# Patient Record
Sex: MALE | Race: WHITE | NOT HISPANIC OR LATINO | Employment: FULL TIME | URBAN - NONMETROPOLITAN AREA
[De-identification: names, ages, dates, MRNs, and addresses within clinical notes are randomized per-mention and may not be internally consistent; named-entity substitution may affect disease eponyms.]

---

## 2017-01-12 DIAGNOSIS — E11.65 TYPE 2 DIABETES MELLITUS WITH HYPERGLYCEMIA (HCC): ICD-10-CM

## 2017-01-12 DIAGNOSIS — E78.5 HYPERLIPIDEMIA: ICD-10-CM

## 2017-01-12 DIAGNOSIS — M79.645 PAIN OF FINGER OF LEFT HAND: ICD-10-CM

## 2017-01-16 ENCOUNTER — APPOINTMENT (OUTPATIENT)
Dept: LAB | Facility: MEDICAL CENTER | Age: 47
End: 2017-01-16
Payer: COMMERCIAL

## 2017-01-16 ENCOUNTER — TRANSCRIBE ORDERS (OUTPATIENT)
Dept: LAB | Facility: MEDICAL CENTER | Age: 47
End: 2017-01-16

## 2017-01-16 DIAGNOSIS — E78.5 HYPERLIPIDEMIA: ICD-10-CM

## 2017-01-16 DIAGNOSIS — E11.65 TYPE 2 DIABETES MELLITUS WITH HYPERGLYCEMIA (HCC): ICD-10-CM

## 2017-01-16 LAB
ALBUMIN SERPL BCP-MCNC: 4.3 G/DL (ref 3.5–5)
ALP SERPL-CCNC: 72 U/L (ref 46–116)
ALT SERPL W P-5'-P-CCNC: 114 U/L (ref 12–78)
ANION GAP SERPL CALCULATED.3IONS-SCNC: 10 MMOL/L (ref 4–13)
AST SERPL W P-5'-P-CCNC: 42 U/L (ref 5–45)
BILIRUB SERPL-MCNC: 0.49 MG/DL (ref 0.2–1)
BUN SERPL-MCNC: 14 MG/DL (ref 5–25)
CALCIUM SERPL-MCNC: 8.8 MG/DL (ref 8.3–10.1)
CHLORIDE SERPL-SCNC: 103 MMOL/L (ref 100–108)
CHOLEST SERPL-MCNC: 139 MG/DL (ref 50–200)
CO2 SERPL-SCNC: 27 MMOL/L (ref 21–32)
CREAT SERPL-MCNC: 1.19 MG/DL (ref 0.6–1.3)
CREAT UR-MCNC: 94.1 MG/DL
EST. AVERAGE GLUCOSE BLD GHB EST-MCNC: 194 MG/DL
GFR SERPL CREATININE-BSD FRML MDRD: >60 ML/MIN/1.73SQ M
GLUCOSE SERPL-MCNC: 175 MG/DL (ref 65–140)
HBA1C MFR BLD: 8.4 % (ref 4.2–6.3)
HDLC SERPL-MCNC: 34 MG/DL (ref 40–60)
LDLC SERPL CALC-MCNC: 65 MG/DL (ref 0–100)
MICROALBUMIN UR-MCNC: 27.4 MG/L (ref 0–20)
MICROALBUMIN/CREAT 24H UR: 29 MG/G CREATININE (ref 0–30)
POTASSIUM SERPL-SCNC: 3.9 MMOL/L (ref 3.5–5.3)
PROT SERPL-MCNC: 7.6 G/DL (ref 6.4–8.2)
SODIUM SERPL-SCNC: 140 MMOL/L (ref 136–145)
TRIGL SERPL-MCNC: 199 MG/DL

## 2017-01-16 PROCEDURE — 82043 UR ALBUMIN QUANTITATIVE: CPT

## 2017-01-16 PROCEDURE — 83036 HEMOGLOBIN GLYCOSYLATED A1C: CPT

## 2017-01-16 PROCEDURE — 82570 ASSAY OF URINE CREATININE: CPT

## 2017-01-16 PROCEDURE — 80061 LIPID PANEL: CPT

## 2017-01-16 PROCEDURE — 36415 COLL VENOUS BLD VENIPUNCTURE: CPT

## 2017-01-16 PROCEDURE — 80053 COMPREHEN METABOLIC PANEL: CPT

## 2017-01-23 ENCOUNTER — ALLSCRIPTS OFFICE VISIT (OUTPATIENT)
Dept: OTHER | Facility: OTHER | Age: 47
End: 2017-01-23

## 2017-01-23 ENCOUNTER — GENERIC CONVERSION - ENCOUNTER (OUTPATIENT)
Dept: FAMILY MEDICINE CLINIC | Facility: CLINIC | Age: 47
End: 2017-01-23

## 2017-02-13 ENCOUNTER — TRANSCRIBE ORDERS (OUTPATIENT)
Dept: RADIOLOGY | Facility: MEDICAL CENTER | Age: 47
End: 2017-02-13

## 2017-02-13 ENCOUNTER — TRANSCRIBE ORDERS (OUTPATIENT)
Dept: LAB | Facility: MEDICAL CENTER | Age: 47
End: 2017-02-13

## 2017-02-13 ENCOUNTER — HOSPITAL ENCOUNTER (OUTPATIENT)
Dept: RADIOLOGY | Facility: MEDICAL CENTER | Age: 47
Discharge: HOME | End: 2017-02-13
Payer: COMMERCIAL

## 2017-02-13 DIAGNOSIS — M79.645 PAIN OF FINGER OF LEFT HAND: ICD-10-CM

## 2017-02-13 PROCEDURE — 73120 X-RAY EXAM OF HAND: CPT

## 2017-02-22 ENCOUNTER — ALLSCRIPTS OFFICE VISIT (OUTPATIENT)
Dept: OTHER | Facility: OTHER | Age: 47
End: 2017-02-22

## 2017-02-22 DIAGNOSIS — M65.4 RADIAL STYLOID TENOSYNOVITIS: ICD-10-CM

## 2017-03-13 ENCOUNTER — TRANSCRIBE ORDERS (OUTPATIENT)
Dept: ADMINISTRATIVE | Facility: HOSPITAL | Age: 47
End: 2017-03-13

## 2017-03-13 ENCOUNTER — APPOINTMENT (OUTPATIENT)
Dept: LAB | Facility: HOSPITAL | Age: 47
End: 2017-03-13
Payer: COMMERCIAL

## 2017-03-13 ENCOUNTER — APPOINTMENT (OUTPATIENT)
Dept: PREADMISSION TESTING | Facility: HOSPITAL | Age: 47
End: 2017-03-13
Payer: COMMERCIAL

## 2017-03-13 DIAGNOSIS — M65.4 RADIAL STYLOID TENOSYNOVITIS: ICD-10-CM

## 2017-03-13 LAB
ANION GAP SERPL CALCULATED.3IONS-SCNC: 9 MMOL/L (ref 4–13)
BASOPHILS # BLD AUTO: 0.04 THOUSANDS/ΜL (ref 0–0.1)
BASOPHILS NFR BLD AUTO: 1 % (ref 0–1)
BUN SERPL-MCNC: 18 MG/DL (ref 5–25)
CALCIUM SERPL-MCNC: 8.8 MG/DL (ref 8.3–10.1)
CHLORIDE SERPL-SCNC: 104 MMOL/L (ref 100–108)
CO2 SERPL-SCNC: 28 MMOL/L (ref 21–32)
CREAT SERPL-MCNC: 1.15 MG/DL (ref 0.6–1.3)
EOSINOPHIL # BLD AUTO: 0.32 THOUSAND/ΜL (ref 0–0.61)
EOSINOPHIL NFR BLD AUTO: 4 % (ref 0–6)
ERYTHROCYTE [DISTWIDTH] IN BLOOD BY AUTOMATED COUNT: 13.7 % (ref 11.6–15.1)
GFR SERPL CREATININE-BSD FRML MDRD: >60 ML/MIN/1.73SQ M
GLUCOSE SERPL-MCNC: 136 MG/DL (ref 65–140)
HCT VFR BLD AUTO: 43.9 % (ref 36.5–49.3)
HGB BLD-MCNC: 14.8 G/DL (ref 12–17)
INR PPP: 1.03 (ref 0.86–1.16)
LYMPHOCYTES # BLD AUTO: 2.69 THOUSANDS/ΜL (ref 0.6–4.47)
LYMPHOCYTES NFR BLD AUTO: 36 % (ref 14–44)
MCH RBC QN AUTO: 29.5 PG (ref 26.8–34.3)
MCHC RBC AUTO-ENTMCNC: 33.7 G/DL (ref 31.4–37.4)
MCV RBC AUTO: 88 FL (ref 82–98)
MONOCYTES # BLD AUTO: 0.61 THOUSAND/ΜL (ref 0.17–1.22)
MONOCYTES NFR BLD AUTO: 8 % (ref 4–12)
NEUTROPHILS # BLD AUTO: 3.77 THOUSANDS/ΜL (ref 1.85–7.62)
NEUTS SEG NFR BLD AUTO: 51 % (ref 43–75)
PLATELET # BLD AUTO: 229 THOUSANDS/UL (ref 149–390)
PMV BLD AUTO: 9.6 FL (ref 8.9–12.7)
POTASSIUM SERPL-SCNC: 4.2 MMOL/L (ref 3.5–5.3)
PROTHROMBIN TIME: 13.2 SECONDS (ref 12–14.3)
RBC # BLD AUTO: 5.02 MILLION/UL (ref 3.88–5.62)
SODIUM SERPL-SCNC: 141 MMOL/L (ref 136–145)
WBC # BLD AUTO: 7.43 THOUSAND/UL (ref 4.31–10.16)

## 2017-03-13 PROCEDURE — 85610 PROTHROMBIN TIME: CPT

## 2017-03-13 PROCEDURE — 80048 BASIC METABOLIC PNL TOTAL CA: CPT

## 2017-03-13 PROCEDURE — 85025 COMPLETE CBC W/AUTO DIFF WBC: CPT

## 2017-03-13 PROCEDURE — 36415 COLL VENOUS BLD VENIPUNCTURE: CPT

## 2017-03-21 ENCOUNTER — ANESTHESIA EVENT (OUTPATIENT)
Dept: PERIOP | Facility: HOSPITAL | Age: 47
End: 2017-03-21
Payer: COMMERCIAL

## 2017-03-23 ENCOUNTER — HOSPITAL ENCOUNTER (OUTPATIENT)
Facility: HOSPITAL | Age: 47
Setting detail: OUTPATIENT SURGERY
Discharge: HOME | End: 2017-03-23
Attending: ORTHOPAEDIC SURGERY | Admitting: ORTHOPAEDIC SURGERY
Payer: COMMERCIAL

## 2017-03-23 ENCOUNTER — ANESTHESIA (OUTPATIENT)
Dept: PERIOP | Facility: HOSPITAL | Age: 47
End: 2017-03-23
Payer: COMMERCIAL

## 2017-03-23 VITALS
WEIGHT: 255 LBS | HEIGHT: 70 IN | DIASTOLIC BLOOD PRESSURE: 75 MMHG | RESPIRATION RATE: 18 BRPM | TEMPERATURE: 98.2 F | SYSTOLIC BLOOD PRESSURE: 109 MMHG | BODY MASS INDEX: 36.51 KG/M2 | HEART RATE: 78 BPM | OXYGEN SATURATION: 96 %

## 2017-03-23 PROBLEM — M65.4 DE QUERVAIN'S TENOSYNOVITIS, RIGHT: Status: ACTIVE | Noted: 2017-03-23

## 2017-03-23 LAB — GLUCOSE SERPL-MCNC: 132 MG/DL (ref 65–140)

## 2017-03-23 PROCEDURE — 82948 REAGENT STRIP/BLOOD GLUCOSE: CPT

## 2017-03-23 RX ORDER — SODIUM CHLORIDE, SODIUM LACTATE, POTASSIUM CHLORIDE, CALCIUM CHLORIDE 600; 310; 30; 20 MG/100ML; MG/100ML; MG/100ML; MG/100ML
125 INJECTION, SOLUTION INTRAVENOUS CONTINUOUS
Status: DISCONTINUED | OUTPATIENT
Start: 2017-03-23 | End: 2017-03-23 | Stop reason: HOSPADM

## 2017-03-23 RX ORDER — FENTANYL CITRATE 50 UG/ML
INJECTION, SOLUTION INTRAMUSCULAR; INTRAVENOUS AS NEEDED
Status: DISCONTINUED | OUTPATIENT
Start: 2017-03-23 | End: 2017-03-23 | Stop reason: SURG

## 2017-03-23 RX ORDER — PROMETHAZINE HYDROCHLORIDE 25 MG/ML
12.5 INJECTION, SOLUTION INTRAMUSCULAR; INTRAVENOUS ONCE AS NEEDED
Status: DISCONTINUED | OUTPATIENT
Start: 2017-03-23 | End: 2017-03-23 | Stop reason: HOSPADM

## 2017-03-23 RX ORDER — ONDANSETRON 2 MG/ML
4 INJECTION INTRAMUSCULAR; INTRAVENOUS ONCE AS NEEDED
Status: DISCONTINUED | OUTPATIENT
Start: 2017-03-23 | End: 2017-03-23 | Stop reason: HOSPADM

## 2017-03-23 RX ORDER — VALSARTAN AND HYDROCHLOROTHIAZIDE 320; 25 MG/1; MG/1
1 TABLET, FILM COATED ORAL
COMMUNITY
End: 2018-09-26

## 2017-03-23 RX ORDER — GLIPIZIDE AND METFORMIN HCL 2.5; 5 MG/1; MG/1
1 TABLET, FILM COATED ORAL
COMMUNITY
End: 2018-04-07 | Stop reason: SDUPTHER

## 2017-03-23 RX ORDER — ONDANSETRON 2 MG/ML
INJECTION INTRAMUSCULAR; INTRAVENOUS AS NEEDED
Status: DISCONTINUED | OUTPATIENT
Start: 2017-03-23 | End: 2017-03-23 | Stop reason: SURG

## 2017-03-23 RX ORDER — MIDAZOLAM HYDROCHLORIDE 1 MG/ML
INJECTION INTRAMUSCULAR; INTRAVENOUS AS NEEDED
Status: DISCONTINUED | OUTPATIENT
Start: 2017-03-23 | End: 2017-03-23 | Stop reason: SURG

## 2017-03-23 RX ORDER — SIMVASTATIN 20 MG
20 TABLET ORAL DAILY
COMMUNITY
End: 2018-12-22 | Stop reason: SDUPTHER

## 2017-03-23 RX ORDER — OXYCODONE HYDROCHLORIDE AND ACETAMINOPHEN 5; 325 MG/1; MG/1
2 TABLET ORAL EVERY 4 HOURS PRN
Status: DISCONTINUED | OUTPATIENT
Start: 2017-03-23 | End: 2017-03-23 | Stop reason: HOSPADM

## 2017-03-23 RX ORDER — METOCLOPRAMIDE HYDROCHLORIDE 5 MG/ML
10 INJECTION INTRAMUSCULAR; INTRAVENOUS ONCE AS NEEDED
Status: DISCONTINUED | OUTPATIENT
Start: 2017-03-23 | End: 2017-03-23 | Stop reason: HOSPADM

## 2017-03-23 RX ORDER — OXYCODONE HYDROCHLORIDE 5 MG/1
5 TABLET ORAL EVERY 4 HOURS PRN
Qty: 30 TABLET | Refills: 0 | Status: SHIPPED | OUTPATIENT
Start: 2017-03-23 | End: 2017-04-02

## 2017-03-23 RX ORDER — MEPERIDINE HYDROCHLORIDE 25 MG/ML
12.5 INJECTION INTRAMUSCULAR; INTRAVENOUS; SUBCUTANEOUS ONCE AS NEEDED
Status: DISCONTINUED | OUTPATIENT
Start: 2017-03-23 | End: 2017-03-23 | Stop reason: HOSPADM

## 2017-03-23 RX ORDER — PROPOFOL 10 MG/ML
INJECTION, EMULSION INTRAVENOUS AS NEEDED
Status: DISCONTINUED | OUTPATIENT
Start: 2017-03-23 | End: 2017-03-23 | Stop reason: SURG

## 2017-03-23 RX ORDER — GLYCOPYRROLATE 0.2 MG/ML
INJECTION INTRAMUSCULAR; INTRAVENOUS AS NEEDED
Status: DISCONTINUED | OUTPATIENT
Start: 2017-03-23 | End: 2017-03-23 | Stop reason: SURG

## 2017-03-23 RX ORDER — FENTANYL CITRATE/PF 50 MCG/ML
25 SYRINGE (ML) INJECTION
Status: DISCONTINUED | OUTPATIENT
Start: 2017-03-23 | End: 2017-03-23 | Stop reason: HOSPADM

## 2017-03-23 RX ORDER — KETOROLAC TROMETHAMINE 30 MG/ML
INJECTION, SOLUTION INTRAMUSCULAR; INTRAVENOUS AS NEEDED
Status: DISCONTINUED | OUTPATIENT
Start: 2017-03-23 | End: 2017-03-23 | Stop reason: SURG

## 2017-03-23 RX ORDER — MAGNESIUM HYDROXIDE 1200 MG/15ML
LIQUID ORAL AS NEEDED
Status: DISCONTINUED | OUTPATIENT
Start: 2017-03-23 | End: 2017-03-23 | Stop reason: HOSPADM

## 2017-03-23 RX ORDER — LIDOCAINE HYDROCHLORIDE 10 MG/ML
INJECTION, SOLUTION INFILTRATION; PERINEURAL AS NEEDED
Status: DISCONTINUED | OUTPATIENT
Start: 2017-03-23 | End: 2017-03-23 | Stop reason: SURG

## 2017-03-23 RX ORDER — METOPROLOL SUCCINATE 50 MG/1
50 TABLET, EXTENDED RELEASE ORAL
COMMUNITY
End: 2018-12-22 | Stop reason: SDUPTHER

## 2017-03-23 RX ADMIN — KETOROLAC TROMETHAMINE 30 MG: 30 INJECTION, SOLUTION INTRAMUSCULAR at 09:32

## 2017-03-23 RX ADMIN — SODIUM CHLORIDE, SODIUM LACTATE, POTASSIUM CHLORIDE, AND CALCIUM CHLORIDE 125 ML/HR: .6; .31; .03; .02 INJECTION, SOLUTION INTRAVENOUS at 07:24

## 2017-03-23 RX ADMIN — LIDOCAINE HYDROCHLORIDE 50 MG: 10 INJECTION, SOLUTION INFILTRATION; PERINEURAL at 09:05

## 2017-03-23 RX ADMIN — GLYCOPYRROLATE 0.2 MG: 0.2 INJECTION, SOLUTION INTRAMUSCULAR; INTRAVENOUS at 09:05

## 2017-03-23 RX ADMIN — PROPOFOL 250 MG: 10 INJECTION, EMULSION INTRAVENOUS at 09:05

## 2017-03-23 RX ADMIN — CEFAZOLIN SODIUM 2000 MG: 2 SOLUTION INTRAVENOUS at 09:09

## 2017-03-23 RX ADMIN — MIDAZOLAM HYDROCHLORIDE 2 MG: 1 INJECTION, SOLUTION INTRAMUSCULAR; INTRAVENOUS at 09:02

## 2017-03-23 RX ADMIN — ONDANSETRON HYDROCHLORIDE 4 MG: 2 INJECTION, SOLUTION INTRAVENOUS at 09:08

## 2017-03-23 RX ADMIN — FENTANYL CITRATE 50 MCG: 50 INJECTION, SOLUTION INTRAMUSCULAR; INTRAVENOUS at 09:12

## 2017-04-04 ENCOUNTER — ALLSCRIPTS OFFICE VISIT (OUTPATIENT)
Dept: OTHER | Facility: OTHER | Age: 47
End: 2017-04-04

## 2017-04-04 DIAGNOSIS — Z98.890 OTHER SPECIFIED POSTPROCEDURAL STATES: ICD-10-CM

## 2017-07-14 DIAGNOSIS — I10 ESSENTIAL (PRIMARY) HYPERTENSION: ICD-10-CM

## 2017-07-14 DIAGNOSIS — E78.5 HYPERLIPIDEMIA: ICD-10-CM

## 2017-07-14 DIAGNOSIS — E11.65 TYPE 2 DIABETES MELLITUS WITH HYPERGLYCEMIA (HCC): ICD-10-CM

## 2017-08-07 ENCOUNTER — TRANSCRIBE ORDERS (OUTPATIENT)
Dept: LAB | Facility: MEDICAL CENTER | Age: 47
End: 2017-08-07

## 2017-08-07 ENCOUNTER — APPOINTMENT (OUTPATIENT)
Dept: LAB | Facility: MEDICAL CENTER | Age: 47
End: 2017-08-07
Payer: COMMERCIAL

## 2017-08-07 DIAGNOSIS — E78.5 HYPERLIPIDEMIA: ICD-10-CM

## 2017-08-07 DIAGNOSIS — I10 ESSENTIAL (PRIMARY) HYPERTENSION: ICD-10-CM

## 2017-08-07 DIAGNOSIS — E11.65 TYPE 2 DIABETES MELLITUS WITH HYPERGLYCEMIA (HCC): ICD-10-CM

## 2017-08-07 LAB
ALBUMIN SERPL BCP-MCNC: 3.9 G/DL (ref 3.5–5)
ALP SERPL-CCNC: 74 U/L (ref 46–116)
ALT SERPL W P-5'-P-CCNC: 84 U/L (ref 12–78)
ANION GAP SERPL CALCULATED.3IONS-SCNC: 8 MMOL/L (ref 4–13)
AST SERPL W P-5'-P-CCNC: 41 U/L (ref 5–45)
BASOPHILS # BLD AUTO: 0.04 THOUSANDS/ΜL (ref 0–0.1)
BASOPHILS NFR BLD AUTO: 1 % (ref 0–1)
BILIRUB SERPL-MCNC: 0.68 MG/DL (ref 0.2–1)
BUN SERPL-MCNC: 16 MG/DL (ref 5–25)
CALCIUM SERPL-MCNC: 8.9 MG/DL (ref 8.3–10.1)
CHLORIDE SERPL-SCNC: 104 MMOL/L (ref 100–108)
CHOLEST SERPL-MCNC: 130 MG/DL (ref 50–200)
CO2 SERPL-SCNC: 26 MMOL/L (ref 21–32)
CREAT SERPL-MCNC: 1.18 MG/DL (ref 0.6–1.3)
CREAT UR-MCNC: 297 MG/DL
EOSINOPHIL # BLD AUTO: 0.35 THOUSAND/ΜL (ref 0–0.61)
EOSINOPHIL NFR BLD AUTO: 6 % (ref 0–6)
ERYTHROCYTE [DISTWIDTH] IN BLOOD BY AUTOMATED COUNT: 13.7 % (ref 11.6–15.1)
EST. AVERAGE GLUCOSE BLD GHB EST-MCNC: 186 MG/DL
GFR SERPL CREATININE-BSD FRML MDRD: 73 ML/MIN/1.73SQ M
GLUCOSE P FAST SERPL-MCNC: 211 MG/DL (ref 65–99)
HBA1C MFR BLD: 8.1 % (ref 4.2–6.3)
HCT VFR BLD AUTO: 43.3 % (ref 36.5–49.3)
HDLC SERPL-MCNC: 29 MG/DL (ref 40–60)
HGB BLD-MCNC: 15.2 G/DL (ref 12–17)
LDLC SERPL CALC-MCNC: 31 MG/DL (ref 0–100)
LYMPHOCYTES # BLD AUTO: 2.61 THOUSANDS/ΜL (ref 0.6–4.47)
LYMPHOCYTES NFR BLD AUTO: 42 % (ref 14–44)
MCH RBC QN AUTO: 30.3 PG (ref 26.8–34.3)
MCHC RBC AUTO-ENTMCNC: 35.1 G/DL (ref 31.4–37.4)
MCV RBC AUTO: 86 FL (ref 82–98)
MICROALBUMIN UR-MCNC: 41.2 MG/L (ref 0–20)
MICROALBUMIN/CREAT 24H UR: 14 MG/G CREATININE (ref 0–30)
MONOCYTES # BLD AUTO: 0.56 THOUSAND/ΜL (ref 0.17–1.22)
MONOCYTES NFR BLD AUTO: 9 % (ref 4–12)
NEUTROPHILS # BLD AUTO: 2.58 THOUSANDS/ΜL (ref 1.85–7.62)
NEUTS SEG NFR BLD AUTO: 42 % (ref 43–75)
NRBC BLD AUTO-RTO: 0 /100 WBCS
PLATELET # BLD AUTO: 201 THOUSANDS/UL (ref 149–390)
PMV BLD AUTO: 10.2 FL (ref 8.9–12.7)
POTASSIUM SERPL-SCNC: 3.9 MMOL/L (ref 3.5–5.3)
PROT SERPL-MCNC: 7.4 G/DL (ref 6.4–8.2)
RBC # BLD AUTO: 5.02 MILLION/UL (ref 3.88–5.62)
SODIUM SERPL-SCNC: 138 MMOL/L (ref 136–145)
TRIGL SERPL-MCNC: 351 MG/DL
WBC # BLD AUTO: 6.16 THOUSAND/UL (ref 4.31–10.16)

## 2017-08-07 PROCEDURE — 82570 ASSAY OF URINE CREATININE: CPT

## 2017-08-07 PROCEDURE — 80061 LIPID PANEL: CPT

## 2017-08-07 PROCEDURE — 85025 COMPLETE CBC W/AUTO DIFF WBC: CPT

## 2017-08-07 PROCEDURE — 82043 UR ALBUMIN QUANTITATIVE: CPT

## 2017-08-07 PROCEDURE — 80053 COMPREHEN METABOLIC PANEL: CPT

## 2017-08-07 PROCEDURE — 36415 COLL VENOUS BLD VENIPUNCTURE: CPT

## 2017-08-07 PROCEDURE — 83036 HEMOGLOBIN GLYCOSYLATED A1C: CPT

## 2017-08-10 ENCOUNTER — ALLSCRIPTS OFFICE VISIT (OUTPATIENT)
Dept: OTHER | Facility: OTHER | Age: 47
End: 2017-08-10

## 2017-09-25 ENCOUNTER — ALLSCRIPTS OFFICE VISIT (OUTPATIENT)
Dept: OTHER | Facility: OTHER | Age: 47
End: 2017-09-25

## 2017-10-04 ENCOUNTER — GENERIC CONVERSION - ENCOUNTER (OUTPATIENT)
Dept: OTHER | Facility: OTHER | Age: 47
End: 2017-10-04

## 2018-01-12 VITALS
DIASTOLIC BLOOD PRESSURE: 80 MMHG | BODY MASS INDEX: 36.4 KG/M2 | OXYGEN SATURATION: 98 % | SYSTOLIC BLOOD PRESSURE: 124 MMHG | HEIGHT: 71 IN | HEART RATE: 89 BPM | WEIGHT: 260 LBS | RESPIRATION RATE: 15 BRPM

## 2018-01-13 VITALS
SYSTOLIC BLOOD PRESSURE: 124 MMHG | HEIGHT: 71 IN | BODY MASS INDEX: 36.29 KG/M2 | WEIGHT: 259.25 LBS | OXYGEN SATURATION: 98 % | HEART RATE: 74 BPM | DIASTOLIC BLOOD PRESSURE: 78 MMHG | RESPIRATION RATE: 15 BRPM

## 2018-01-13 VITALS
OXYGEN SATURATION: 96 % | BODY MASS INDEX: 36.42 KG/M2 | HEART RATE: 88 BPM | HEIGHT: 71 IN | RESPIRATION RATE: 15 BRPM | DIASTOLIC BLOOD PRESSURE: 82 MMHG | WEIGHT: 260.13 LBS | SYSTOLIC BLOOD PRESSURE: 130 MMHG

## 2018-01-13 VITALS
HEART RATE: 64 BPM | DIASTOLIC BLOOD PRESSURE: 81 MMHG | SYSTOLIC BLOOD PRESSURE: 125 MMHG | HEIGHT: 71 IN | WEIGHT: 259 LBS | BODY MASS INDEX: 36.26 KG/M2

## 2018-01-14 VITALS
HEIGHT: 71 IN | HEART RATE: 77 BPM | BODY MASS INDEX: 35.72 KG/M2 | WEIGHT: 255.13 LBS | SYSTOLIC BLOOD PRESSURE: 104 MMHG | DIASTOLIC BLOOD PRESSURE: 70 MMHG

## 2018-01-19 DIAGNOSIS — E11.65 TYPE 2 DIABETES MELLITUS WITH HYPERGLYCEMIA (HCC): ICD-10-CM

## 2018-03-08 ENCOUNTER — TELEPHONE (OUTPATIENT)
Dept: FAMILY MEDICINE CLINIC | Facility: CLINIC | Age: 48
End: 2018-03-08

## 2018-03-08 DIAGNOSIS — J32.9 SINUSITIS, UNSPECIFIED CHRONICITY, UNSPECIFIED LOCATION: Primary | ICD-10-CM

## 2018-03-08 RX ORDER — AMOXICILLIN 500 MG/1
500 TABLET, FILM COATED ORAL 3 TIMES DAILY
Qty: 30 TABLET | Refills: 0 | Status: SHIPPED | OUTPATIENT
Start: 2018-03-08 | End: 2018-03-18

## 2018-04-07 DIAGNOSIS — E11.9 TYPE 2 DIABETES MELLITUS WITHOUT COMPLICATION, WITHOUT LONG-TERM CURRENT USE OF INSULIN (HCC): Primary | ICD-10-CM

## 2018-04-07 RX ORDER — GLIPIZIDE AND METFORMIN HCL 2.5; 5 MG/1; MG/1
TABLET, FILM COATED ORAL
Qty: 360 TABLET | Refills: 2 | Status: SHIPPED | OUTPATIENT
Start: 2018-04-07 | End: 2019-01-02 | Stop reason: SDUPTHER

## 2018-07-30 DIAGNOSIS — E11.9 TYPE 2 DIABETES MELLITUS WITHOUT COMPLICATION, WITHOUT LONG-TERM CURRENT USE OF INSULIN (HCC): Primary | ICD-10-CM

## 2018-09-10 ENCOUNTER — APPOINTMENT (OUTPATIENT)
Dept: LAB | Facility: MEDICAL CENTER | Age: 48
End: 2018-09-10
Payer: COMMERCIAL

## 2018-09-10 DIAGNOSIS — E11.9 TYPE 2 DIABETES MELLITUS WITHOUT COMPLICATION, WITHOUT LONG-TERM CURRENT USE OF INSULIN (HCC): ICD-10-CM

## 2018-09-10 LAB
ALBUMIN SERPL BCP-MCNC: 4 G/DL (ref 3.5–5)
ALP SERPL-CCNC: 67 U/L (ref 46–116)
ALT SERPL W P-5'-P-CCNC: 57 U/L (ref 12–78)
ANION GAP SERPL CALCULATED.3IONS-SCNC: 12 MMOL/L (ref 4–13)
AST SERPL W P-5'-P-CCNC: 26 U/L (ref 5–45)
BILIRUB SERPL-MCNC: 0.55 MG/DL (ref 0.2–1)
BUN SERPL-MCNC: 15 MG/DL (ref 5–25)
CALCIUM SERPL-MCNC: 8.8 MG/DL (ref 8.3–10.1)
CHLORIDE SERPL-SCNC: 102 MMOL/L (ref 100–108)
CHOLEST SERPL-MCNC: 131 MG/DL (ref 50–200)
CO2 SERPL-SCNC: 23 MMOL/L (ref 21–32)
CREAT SERPL-MCNC: 1.25 MG/DL (ref 0.6–1.3)
CREAT UR-MCNC: 40.7 MG/DL
GFR SERPL CREATININE-BSD FRML MDRD: 67 ML/MIN/1.73SQ M
GLUCOSE P FAST SERPL-MCNC: 136 MG/DL (ref 65–99)
HDLC SERPL-MCNC: 29 MG/DL (ref 40–60)
LDLC SERPL CALC-MCNC: 36 MG/DL (ref 0–100)
MICROALBUMIN UR-MCNC: <5 MG/L (ref 0–20)
MICROALBUMIN/CREAT 24H UR: <12 MG/G CREATININE (ref 0–30)
NONHDLC SERPL-MCNC: 102 MG/DL
POTASSIUM SERPL-SCNC: 3.7 MMOL/L (ref 3.5–5.3)
PROT SERPL-MCNC: 7.5 G/DL (ref 6.4–8.2)
SODIUM SERPL-SCNC: 137 MMOL/L (ref 136–145)
TRIGL SERPL-MCNC: 329 MG/DL
TSH SERPL DL<=0.05 MIU/L-ACNC: 1.96 UIU/ML (ref 0.36–3.74)

## 2018-09-10 PROCEDURE — 36415 COLL VENOUS BLD VENIPUNCTURE: CPT

## 2018-09-10 PROCEDURE — 80053 COMPREHEN METABOLIC PANEL: CPT

## 2018-09-10 PROCEDURE — 80061 LIPID PANEL: CPT

## 2018-09-10 PROCEDURE — 84443 ASSAY THYROID STIM HORMONE: CPT

## 2018-09-10 PROCEDURE — 82043 UR ALBUMIN QUANTITATIVE: CPT | Performed by: FAMILY MEDICINE

## 2018-09-10 PROCEDURE — 82570 ASSAY OF URINE CREATININE: CPT | Performed by: FAMILY MEDICINE

## 2018-09-10 PROCEDURE — 83036 HEMOGLOBIN GLYCOSYLATED A1C: CPT

## 2018-09-11 LAB
EST. AVERAGE GLUCOSE BLD GHB EST-MCNC: 163 MG/DL
HBA1C MFR BLD: 7.3 % (ref 4.2–6.3)

## 2018-09-20 DIAGNOSIS — E11.9 TYPE 2 DIABETES MELLITUS WITHOUT COMPLICATION, WITH LONG-TERM CURRENT USE OF INSULIN (HCC): Primary | ICD-10-CM

## 2018-09-20 DIAGNOSIS — Z79.4 TYPE 2 DIABETES MELLITUS WITHOUT COMPLICATION, WITH LONG-TERM CURRENT USE OF INSULIN (HCC): Primary | ICD-10-CM

## 2018-09-20 RX ORDER — SITAGLIPTIN 100 MG/1
TABLET, FILM COATED ORAL
Qty: 90 TABLET | Refills: 3 | Status: SHIPPED | OUTPATIENT
Start: 2018-09-20 | End: 2019-09-16 | Stop reason: SDUPTHER

## 2018-09-26 ENCOUNTER — OFFICE VISIT (OUTPATIENT)
Dept: FAMILY MEDICINE CLINIC | Facility: CLINIC | Age: 48
End: 2018-09-26
Payer: COMMERCIAL

## 2018-09-26 VITALS
DIASTOLIC BLOOD PRESSURE: 84 MMHG | WEIGHT: 254.6 LBS | BODY MASS INDEX: 36.45 KG/M2 | HEIGHT: 70 IN | SYSTOLIC BLOOD PRESSURE: 128 MMHG | RESPIRATION RATE: 16 BRPM | OXYGEN SATURATION: 98 % | HEART RATE: 81 BPM

## 2018-09-26 DIAGNOSIS — G47.33 OBSTRUCTIVE SLEEP APNEA: ICD-10-CM

## 2018-09-26 DIAGNOSIS — Z12.11 ENCOUNTER FOR SCREENING COLONOSCOPY: Primary | ICD-10-CM

## 2018-09-26 DIAGNOSIS — Z23 NEED FOR INFLUENZA VACCINATION: ICD-10-CM

## 2018-09-26 DIAGNOSIS — E11.65 TYPE 2 DIABETES MELLITUS WITH HYPERGLYCEMIA, WITHOUT LONG-TERM CURRENT USE OF INSULIN (HCC): ICD-10-CM

## 2018-09-26 DIAGNOSIS — E78.5 HYPERLIPIDEMIA, UNSPECIFIED HYPERLIPIDEMIA TYPE: ICD-10-CM

## 2018-09-26 DIAGNOSIS — I10 ESSENTIAL HYPERTENSION: ICD-10-CM

## 2018-09-26 PROCEDURE — 3079F DIAST BP 80-89 MM HG: CPT | Performed by: FAMILY MEDICINE

## 2018-09-26 PROCEDURE — 1036F TOBACCO NON-USER: CPT | Performed by: FAMILY MEDICINE

## 2018-09-26 PROCEDURE — 3008F BODY MASS INDEX DOCD: CPT | Performed by: FAMILY MEDICINE

## 2018-09-26 PROCEDURE — 99214 OFFICE O/P EST MOD 30 MIN: CPT | Performed by: FAMILY MEDICINE

## 2018-09-26 PROCEDURE — 3074F SYST BP LT 130 MM HG: CPT | Performed by: FAMILY MEDICINE

## 2018-09-26 RX ORDER — OLMESARTAN MEDOXOMIL AND HYDROCHLOROTHIAZIDE 40/25 40; 25 MG/1; MG/1
1 TABLET ORAL DAILY
Qty: 90 TABLET | Refills: 3 | Status: SHIPPED | OUTPATIENT
Start: 2018-09-26 | End: 2019-08-19 | Stop reason: SDUPTHER

## 2018-09-26 NOTE — PATIENT INSTRUCTIONS
Diabetes in the Older Adult   WHAT YOU NEED TO KNOW:   Older adults with diabetes are at risk for heart disease, stroke, kidney disease, blindness, and nerve damage  You may also be at risk for any of the following:  · Poor nutrition or low blood sugar levels    · Confusion or problems with memory, attention, or learning new things    · Trouble controlling urination or frequent urinary tract infections    · Trouble with coordination or balance    · Falls and injuries    · Pain    · Depression    · Open sores on your legs or feet  DISCHARGE INSTRUCTIONS:   Call 911 for any of the following:   · You have any of the following signs of a stroke:      ¨ Numbness or drooping on one side of your face     ¨ Weakness in an arm or leg    ¨ Confusion or difficulty speaking    ¨ Dizziness, a severe headache, or vision loss    · You have any of the following signs of a heart attack:      ¨ Squeezing, pressure, or pain in your chest that lasts longer than 5 minutes or returns    ¨ Discomfort or pain in your back, neck, jaw, stomach, or arm     ¨ Trouble breathing    ¨ Nausea or vomiting    ¨ Lightheadedness or a sudden cold sweat, especially with chest pain or trouble breathing  Return to the emergency department if:   · You have severe abdominal pain, or the pain spreads to your back  You may also be vomiting  · You have trouble staying awake or focusing  · You are shaking or sweating  · You have blurred or double vision  · Your breath has a fruity, sweet smell  · Your breathing is deep and labored, or rapid and shallow  · Your heartbeat is fast and weak  · You fall and get hurt  Contact your healthcare provider if:   · You are vomiting or have diarrhea  · You have an upset stomach and cannot eat the foods on your meal plan  · You feel weak or more tired than usual      · You feel dizzy, have headaches, or are easily irritated  · Your skin is red, warm, dry, or swollen       · You have a wound that does not heal      · You have numbness in your arms or legs  · You have trouble coping with your illness, or you feel anxious or depressed  · You have problems with your memory  · You have changes in your vision  · You have questions or concerns about your condition or care  Medicines  may be given to decrease the amount of sugar in your blood  You may also need medicine to lower your blood pressure or cholesterol, or medicine to prevent blood clots  Manage the ABCs and prevent problems caused by diabetes:   · Check your blood sugar levels as directed  Your healthcare provider will tell you when and how often to check during the day  Your healthcare provider will also tell you what your blood sugar levels should be before and after a meal  You may need to check for ketones in your urine or blood if your level is higher than directed  Write down your results and show them to your healthcare provider  Your provider may use the results to make changes to your medicine, food, or exercise schedules  Ask your healthcare provider for more information about how to treat a high or low blood sugar level  · Follow your meal plan as directed  A dietitian will help you make a meal plan to keep your blood sugar level steady and make sure you get enough nutrition  Do not skip meals  Your blood sugar level may drop too low if you have taken diabetes medicine and do not eat  Ask your healthcare provider about programs in your community that can deliver the meals to your home  · Try to be active for 30 to 60 minutes most days of the week  Exercise can help keep your blood sugar level steady, decrease your risk of heart disease, and help you lose weight  It can also help improve your balance and decrease your risk for falls  Work with your healthcare provider to create an exercise plan  Ask a family member or friend to exercise with you   Start slow and exercise for 5 to 10 minutes at a time  Examples of activities include walking or swimming  Include muscle strengthening activities 2 to 3 days each week  Include balance training 2 to 3 times each week  Activities that help increase balance include yoga and devan chi      · Maintain a healthy weight  Ask your healthcare provider how much you should weigh  A healthy weight can help you control your diabetes and prevent heart disease  Ask your provider to help you create a weight loss plan if you are overweight  Together you can set manageable weight loss goals  · Do not smoke  Ask your healthcare provider for information if you currently smoke and need help to quit  Do not use e-cigarettes or smokeless tobacco in place of cigarettes or to help you quit  They still contain nicotine  · Manage stress  Stress may increase your blood sugar level  Deep breathing, muscle relaxation, and music may help you relax  Ask your healthcare provider for more information about these practices  Other ways to manage your diabetes:   · Check your feet every day for sores  Look at your whole foot, including the bottom, and between and under your toes  Check for wounds, corns, and calluses  Use a mirror to see the bottom of your feet  The skin on your feet may be shiny, tight, dry, or darker than normal  Your feet may also be cold and pale  Feel your feet by running your hands along the tops, bottoms, sides, and between your toes  Redness, swelling, and warmth are signs of blood flow problems that can lead to a foot ulcer  Do not try to remove corns or calluses yourself  · Wear medical alert identification  Wear medical alert jewelry or carry a card that says you have diabetes  Ask your healthcare provider where to get these items  · Ask about vaccines  You have a higher risk for serious illness if you get the flu, pneumonia, or hepatitis   Ask your healthcare provider if you should get a flu, pneumonia, shingles, or hepatitis B vaccine, and when to get the vaccine  · Keep all appointments  You may need to return to have your A1c checked every 3 months  You will need to return at least once each year to have your feet checked  You will need an eye exam once a year to check for retinopathy  You will also need urine tests every year to check for kidney problems  You may need tests to monitor for heart disease  Write down your questions so you remember to ask them during your visits  · Get help from family and friends  You may need help checking your blood sugar level, giving insulin injections, or preparing your meals  Ask your family and friends to help you with these tasks  Talk to your healthcare provider if you do not have someone at home to help you  A healthcare provider can come to your home to help you with these tasks  Follow up with your healthcare provider as directed: You may need to return to have your A1c checked every 3 months  You will need to return at least once each year to have your feet checked  You will need an eye exam once a year to check for retinopathy  You will also need urine tests every year to check for kidney problems  You may need tests to monitor for heart disease  Write down your questions so you remember to ask them during your visits  © 2017 [employer] Information is for End User's use only and may not be sold, redistributed or otherwise used for commercial purposes  All illustrations and images included in CareNotes® are the copyrighted property of [employer]  or [employer]  The above information is an  only  It is not intended as medical advice for individual conditions or treatments  Talk to your doctor, nurse or pharmacist before following any medical regimen to see if it is safe and effective for you

## 2018-09-26 NOTE — PROGRESS NOTES
Assessment/Plan:    No problem-specific Assessment & Plan notes found for this encounter  Diagnoses and all orders for this visit:    Encounter for screening colonoscopy  -     Ambulatory referral to Gastroenterology; Future    Need for influenza vaccination  -     influenza vaccine, [id number], TRIVALENT, 0 5 mL, preservative-free (SYRINGE), for adult and pediatric patients 3 yr+ (AFLURIA)    Essential hypertension  -     olmesartan-hydrochlorothiazide (BENICAR HCT) 40-25 MG per tablet; Take 1 tablet by mouth daily    Hyperlipidemia, unspecified hyperlipidemia type    Obstructive sleep apnea    Type 2 diabetes mellitus with hyperglycemia, without long-term current use of insulin (HCC)          His A1c is improved  I am happy with his glycemic control  I feel that there is low risk for hypoglycemic events  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  His obstructive sleep apnea is well controlled  He is very compliant with the CPAP machine  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  Subjective:      Patient ID: Mamie Trinh is a 46 y o  male  Diabetes   He has type 2 diabetes mellitus  No MedicAlert identification noted  The initial diagnosis of diabetes was made 7 years ago  Pertinent negatives for hypoglycemia include no confusion, dizziness, headaches, hunger, mood changes, nervousness/anxiousness, pallor, seizures, sleepiness, speech difficulty, sweats or tremors  Pertinent negatives for diabetes include no blurred vision, no chest pain, no fatigue, no foot paresthesias, no foot ulcerations, no polydipsia, no polyphagia, no polyuria, no visual change, no weakness and no weight loss  Pertinent negatives for hypoglycemia complications include no blackouts, no hospitalization, no nocturnal hypoglycemia, no required assistance and no required glucagon injection   Symptoms are stable  Pertinent negatives for diabetic complications include no CVA, heart disease, impotence, nephropathy, peripheral neuropathy, PVD or retinopathy  Risk factors for coronary artery disease include hypertension  Current diabetic treatment includes oral agent (triple therapy)  He is compliant with treatment some of the time  His weight is fluctuating minimally  He is following a generally healthy and high fat/cholesterol diet  When asked about meal planning, he reported none  He has not had a previous visit with a dietitian  He participates in exercise three times a week  He monitors blood glucose at home 1-2 x per week  He monitors urine at home <1 x per month  Blood glucose monitoring compliance is inadequate  His home blood glucose trend is fluctuating minimally  His breakfast blood glucose is taken between 6-7 am  His breakfast blood glucose range is generally 130-140 mg/dl  His lunch blood glucose is taken between 12-1 pm  His lunch blood glucose range is generally 110-130 mg/dl  His dinner blood glucose is taken between 5-6 pm  His dinner blood glucose range is generally 110-130 mg/dl  His highest blood glucose is 130-140 mg/dl  His overall blood glucose range is 130-140 mg/dl  He does not see a podiatrist Eye exam is not current  His A1c was 7 3 percent in September of 2018  He has had no hypoglycemic events  His diabetes is under good control and he is at low risk for having hypoglycemic events  He is aware of the signs and symptoms of hypoglycemia  He knows how to treat the symptoms  He carries sources of sugar with him in his truck  His blood pressure has been under good control  We have decided to switch his valsartan to Benicar/hydrochlorothiazide  He has no chest pain or shortness of breath  He has no headache or vision changes  He has sleep apnea  He is very compliant with his CPAP machine  His setting is 6 cm of water  He feels well on his current regimen    He has no daytime sleepiness or morning headache  He has no a arrhythmia  The following portions of the patient's history were reviewed and updated as appropriate:   He  has a past medical history of Asthma (01/17/2013); De Quervain's tenosynovitis, right (3/23/2017); Diabetes mellitus ([facility] ); Hyperlipidemia; Hypertension; and Sleep apnea  He   Patient Active Problem List    Diagnosis Date Noted    De Quervain's tenosynovitis, right 03/23/2017    Obstructive sleep apnea 08/28/2015    Type 2 diabetes mellitus with hyperglycemia ([facility] ) 05/30/2013    Hyperlipidemia 01/17/2013    Hypertension 01/17/2013     He  has a past surgical history that includes Rotator cuff repair (Right); Clavicle surgery; Inguinal hernia repair (Left); pr incis tendon sheath,radial styloid (Right, 3/23/2017); and Orchiectomy (Left)  His family history includes Asthma in his mother; Atrial fibrillation in his father; Diabetes in his mother  He  reports that he has never smoked  He has never used smokeless tobacco  He reports that he drinks alcohol  He reports that he does not use drugs  Current Outpatient Prescriptions   Medication Sig Dispense Refill    glipiZIDE-metFORMIN (METAGLIP) 2 5-500 MG per tablet TAKE 2 TABLETS TWICE A  tablet 2    JANUVIA 100 MG tablet TAKE 1 TABLET DAILY 90 tablet 3    metoprolol succinate (TOPROL-XL) 50 mg 24 hr tablet Take 50 mg by mouth      simvastatin (ZOCOR) 20 mg tablet Take 20 mg by mouth daily      olmesartan-hydrochlorothiazide (BENICAR HCT) 40-25 MG per tablet Take 1 tablet by mouth daily 90 tablet 3    sitaGLIPtin (JANUVIA) 50 mg tablet Take 50 mg by mouth       No current facility-administered medications for this visit        Current Outpatient Prescriptions on File Prior to Visit   Medication Sig    glipiZIDE-metFORMIN (METAGLIP) 2 5-500 MG per tablet TAKE 2 TABLETS TWICE A DAY    JANUVIA 100 MG tablet TAKE 1 TABLET DAILY    metoprolol succinate (TOPROL-XL) 50 mg 24 hr tablet Take 50 mg by mouth    simvastatin (ZOCOR) 20 mg tablet Take 20 mg by mouth daily    [DISCONTINUED] valsartan-hydrochlorothiazide (DIOVAN-HCT) 320-25 MG per tablet Take 1 tablet by mouth    sitaGLIPtin (JANUVIA) 50 mg tablet Take 50 mg by mouth    [DISCONTINUED] aspirin 81 MG tablet Take 81 mg by mouth daily     No current facility-administered medications on file prior to visit  He has No Known Allergies       Review of Systems   Constitutional: Negative for fatigue and weight loss  Eyes: Negative for blurred vision  Cardiovascular: Negative for chest pain  Endocrine: Negative for polydipsia, polyphagia and polyuria  Genitourinary: Negative for impotence  Skin: Negative for pallor  Neurological: Negative for dizziness, tremors, seizures, speech difficulty, weakness and headaches  Psychiatric/Behavioral: Negative for confusion  The patient is not nervous/anxious  All other systems reviewed and are negative  Objective:      /84 (BP Location: Right arm, Patient Position: Sitting, Cuff Size: Large)   Pulse 81   Resp 16   Ht 5' 10" (1 778 m)   Wt 115 kg (254 lb 9 6 oz)   SpO2 98%   BMI 36 53 kg/m²          Physical Exam   Constitutional: He is oriented to person, place, and time  He appears well-developed and well-nourished  Neck: Normal range of motion  Neck supple  Pulmonary/Chest: Effort normal and breath sounds normal    Abdominal: Soft  Bowel sounds are normal    Musculoskeletal: Normal range of motion  Neurological: He is alert and oriented to person, place, and time  He has normal reflexes  Skin: Skin is warm and dry  Psychiatric: He has a normal mood and affect  His behavior is normal  Judgment and thought content normal    Nursing note and vitals reviewed

## 2018-11-16 DIAGNOSIS — E11.9 TYPE 2 DIABETES MELLITUS WITHOUT COMPLICATION, WITHOUT LONG-TERM CURRENT USE OF INSULIN (HCC): Primary | ICD-10-CM

## 2018-11-16 RX ORDER — CANAGLIFLOZIN 100 MG/1
TABLET, FILM COATED ORAL
Qty: 90 TABLET | Refills: 3 | Status: SHIPPED | OUTPATIENT
Start: 2018-11-16 | End: 2019-02-05

## 2018-12-22 DIAGNOSIS — I10 ESSENTIAL HYPERTENSION: Primary | ICD-10-CM

## 2018-12-24 RX ORDER — METOPROLOL SUCCINATE 50 MG/1
TABLET, EXTENDED RELEASE ORAL
Qty: 2700 TABLET | Refills: 2 | Status: SHIPPED | OUTPATIENT
Start: 2018-12-24 | End: 2020-03-02

## 2018-12-24 RX ORDER — SIMVASTATIN 20 MG
TABLET ORAL
Qty: 90 TABLET | Refills: 3 | Status: SHIPPED | OUTPATIENT
Start: 2018-12-24 | End: 2020-02-03 | Stop reason: SDUPTHER

## 2019-01-02 DIAGNOSIS — E11.9 TYPE 2 DIABETES MELLITUS WITHOUT COMPLICATION, WITHOUT LONG-TERM CURRENT USE OF INSULIN (HCC): ICD-10-CM

## 2019-01-02 RX ORDER — GLIPIZIDE AND METFORMIN HCL 2.5; 5 MG/1; MG/1
TABLET, FILM COATED ORAL
Qty: 360 TABLET | Refills: 2 | Status: SHIPPED | OUTPATIENT
Start: 2019-01-02 | End: 2019-09-30 | Stop reason: SDUPTHER

## 2019-02-05 ENCOUNTER — OFFICE VISIT (OUTPATIENT)
Dept: FAMILY MEDICINE CLINIC | Facility: CLINIC | Age: 49
End: 2019-02-05
Payer: COMMERCIAL

## 2019-02-05 VITALS
BODY MASS INDEX: 35.79 KG/M2 | RESPIRATION RATE: 14 BRPM | HEART RATE: 73 BPM | WEIGHT: 250 LBS | OXYGEN SATURATION: 98 % | SYSTOLIC BLOOD PRESSURE: 132 MMHG | HEIGHT: 70 IN | DIASTOLIC BLOOD PRESSURE: 82 MMHG

## 2019-02-05 DIAGNOSIS — J32.9 SINUSITIS, UNSPECIFIED CHRONICITY, UNSPECIFIED LOCATION: Primary | ICD-10-CM

## 2019-02-05 DIAGNOSIS — E78.5 HYPERLIPIDEMIA, UNSPECIFIED HYPERLIPIDEMIA TYPE: ICD-10-CM

## 2019-02-05 DIAGNOSIS — Z79.4 TYPE 2 DIABETES MELLITUS WITH COMPLICATION, WITH LONG-TERM CURRENT USE OF INSULIN (HCC): Primary | ICD-10-CM

## 2019-02-05 DIAGNOSIS — E11.8 TYPE 2 DIABETES MELLITUS WITH COMPLICATION, WITH LONG-TERM CURRENT USE OF INSULIN (HCC): Primary | ICD-10-CM

## 2019-02-05 DIAGNOSIS — R21 RASH: ICD-10-CM

## 2019-02-05 DIAGNOSIS — E11.65 TYPE 2 DIABETES MELLITUS WITH HYPERGLYCEMIA, WITHOUT LONG-TERM CURRENT USE OF INSULIN (HCC): ICD-10-CM

## 2019-02-05 DIAGNOSIS — G47.33 OBSTRUCTIVE SLEEP APNEA: ICD-10-CM

## 2019-02-05 DIAGNOSIS — I10 ESSENTIAL HYPERTENSION: ICD-10-CM

## 2019-02-05 PROBLEM — E11.9 ENCOUNTER FOR DIABETIC FOOT EXAM (HCC): Status: ACTIVE | Noted: 2019-02-05

## 2019-02-05 LAB
SL AMB POCT HEMOGLOBIN AIC: 6.8 (ref ?–6.5)
SL AMB POCT HEMOGLOBIN AIC: 8.6 (ref ?–6.5)

## 2019-02-05 PROCEDURE — 99214 OFFICE O/P EST MOD 30 MIN: CPT | Performed by: FAMILY MEDICINE

## 2019-02-05 PROCEDURE — 3075F SYST BP GE 130 - 139MM HG: CPT | Performed by: FAMILY MEDICINE

## 2019-02-05 PROCEDURE — 83036 HEMOGLOBIN GLYCOSYLATED A1C: CPT

## 2019-02-05 PROCEDURE — 3008F BODY MASS INDEX DOCD: CPT | Performed by: FAMILY MEDICINE

## 2019-02-05 PROCEDURE — 3079F DIAST BP 80-89 MM HG: CPT | Performed by: FAMILY MEDICINE

## 2019-02-05 RX ORDER — CLOTRIMAZOLE AND BETAMETHASONE DIPROPIONATE 10; .64 MG/G; MG/G
CREAM TOPICAL 2 TIMES DAILY
Qty: 30 G | Refills: 0 | Status: SHIPPED | OUTPATIENT
Start: 2019-02-05 | End: 2020-02-03

## 2019-02-05 RX ORDER — AZITHROMYCIN 250 MG/1
500 TABLET, FILM COATED ORAL EVERY 24 HOURS
Qty: 10 TABLET | Refills: 0 | Status: SHIPPED | OUTPATIENT
Start: 2019-02-05 | End: 2019-02-10

## 2019-02-05 NOTE — PATIENT INSTRUCTIONS

## 2019-02-05 NOTE — PROGRESS NOTES
Assessment/Plan:    Obstructive sleep apnea  Continue current  Type 2 diabetes mellitus with hyperglycemia (HCC)  Lab Results   Component Value Date    HGBA1C 7 3 (H) 09/10/2018     Continue current  Hypertension  Continue current  Hyperlipidemia  Continue high intensity statin  Diagnoses and all orders for this visit:    Sinusitis, unspecified chronicity, unspecified location    Type 2 diabetes mellitus with hyperglycemia, without long-term current use of insulin ([facility] )    Obstructive sleep apnea    Essential hypertension    Hyperlipidemia, unspecified hyperlipidemia type          Subjective:      Patient ID: Reena Baron is a 46 y o  male  His BP is well controlled on his current regimen  He has no CP or SOB  He has no HA or vision changes  His DM is under better control  He has no side effects and no hypoglycemia  His lipids are not at goal   His triglycerides are elevated and his HDL is decreased  He has no myalgia or muscle weakness  The following portions of the patient's history were reviewed and updated as appropriate:   He  has a past medical history of Asthma (01/17/2013); De Quervain's tenosynovitis, right (3/23/2017); Diabetes mellitus ([facility] ); Hyperlipidemia; Hypertension; and Sleep apnea  He   Patient Active Problem List    Diagnosis Date Noted    De Quervain's tenosynovitis, right 03/23/2017    Obstructive sleep apnea 08/28/2015    Type 2 diabetes mellitus with hyperglycemia ([facility] ) 05/30/2013    Hyperlipidemia 01/17/2013    Hypertension 01/17/2013     He  has a past surgical history that includes Rotator cuff repair (Right); Clavicle surgery; Inguinal hernia repair (Left); pr incis tendon sheath,radial styloid (Right, 3/23/2017); and Orchiectomy (Left)  His family history includes Asthma in his mother; Atrial fibrillation in his father; Diabetes in his mother  He  reports that he has never smoked   He has never used smokeless tobacco  He reports that he drinks alcohol  He reports that he does not use drugs  Current Outpatient Prescriptions   Medication Sig Dispense Refill    glipiZIDE-metFORMIN (METAGLIP) 2 5-500 MG per tablet TAKE 2 TABLETS TWICE A  tablet 2    INVOKANA 100 MG TAKE 1 TABLET DAILY 90 tablet 3    JANUVIA 100 MG tablet TAKE 1 TABLET DAILY 90 tablet 3    metoprolol succinate (TOPROL-XL) 50 mg 24 hr tablet TAKE 1 TABLET DAILY 2700 tablet 2    olmesartan-hydrochlorothiazide (BENICAR HCT) 40-25 MG per tablet Take 1 tablet by mouth daily 90 tablet 3    simvastatin (ZOCOR) 20 mg tablet TAKE 1 TABLET DAILY IN THE EVENING 90 tablet 3    sitaGLIPtin (JANUVIA) 50 mg tablet Take 50 mg by mouth       No current facility-administered medications for this visit  Current Outpatient Prescriptions on File Prior to Visit   Medication Sig    glipiZIDE-metFORMIN (METAGLIP) 2 5-500 MG per tablet TAKE 2 TABLETS TWICE A DAY    INVOKANA 100 MG TAKE 1 TABLET DAILY    JANUVIA 100 MG tablet TAKE 1 TABLET DAILY    metoprolol succinate (TOPROL-XL) 50 mg 24 hr tablet TAKE 1 TABLET DAILY    olmesartan-hydrochlorothiazide (BENICAR HCT) 40-25 MG per tablet Take 1 tablet by mouth daily    simvastatin (ZOCOR) 20 mg tablet TAKE 1 TABLET DAILY IN THE EVENING    sitaGLIPtin (JANUVIA) 50 mg tablet Take 50 mg by mouth    [DISCONTINUED] aspirin 81 MG tablet Take 81 mg by mouth daily     No current facility-administered medications on file prior to visit  He has No Known Allergies       Review of Systems   All other systems reviewed and are negative  Objective:      /82 (BP Location: Left arm, Patient Position: Sitting)   Pulse 73   Resp 14   Ht 5' 10" (1 778 m)   Wt 113 kg (250 lb)   SpO2 98%   BMI 35 87 kg/m²          Physical Exam   Constitutional: He is oriented to person, place, and time  He appears well-developed and well-nourished  Neck: Normal range of motion  Neck supple     Cardiovascular: Normal rate, regular rhythm, normal heart sounds and intact distal pulses  Pulmonary/Chest: Effort normal and breath sounds normal    Abdominal: Soft  Bowel sounds are normal    Musculoskeletal: Normal range of motion  Neurological: He is alert and oriented to person, place, and time  Skin: Skin is warm and dry  Psychiatric: He has a normal mood and affect  His behavior is normal  Judgment and thought content normal    Nursing note and vitals reviewed

## 2019-08-19 DIAGNOSIS — I10 ESSENTIAL HYPERTENSION: ICD-10-CM

## 2019-08-19 RX ORDER — OLMESARTAN MEDOXOMIL AND HYDROCHLOROTHIAZIDE 40/25 40; 25 MG/1; MG/1
TABLET ORAL
Qty: 90 TABLET | Refills: 4 | Status: SHIPPED | OUTPATIENT
Start: 2019-08-19 | End: 2020-11-11

## 2019-09-16 DIAGNOSIS — Z79.4 TYPE 2 DIABETES MELLITUS WITHOUT COMPLICATION, WITH LONG-TERM CURRENT USE OF INSULIN (HCC): ICD-10-CM

## 2019-09-16 DIAGNOSIS — E11.9 TYPE 2 DIABETES MELLITUS WITHOUT COMPLICATION, WITH LONG-TERM CURRENT USE OF INSULIN (HCC): ICD-10-CM

## 2019-09-16 RX ORDER — SITAGLIPTIN 100 MG/1
TABLET, FILM COATED ORAL
Qty: 90 TABLET | Refills: 4 | Status: SHIPPED | OUTPATIENT
Start: 2019-09-16 | End: 2020-02-03

## 2019-09-30 DIAGNOSIS — E11.9 TYPE 2 DIABETES MELLITUS WITHOUT COMPLICATION, WITHOUT LONG-TERM CURRENT USE OF INSULIN (HCC): ICD-10-CM

## 2019-09-30 RX ORDER — GLIPIZIDE AND METFORMIN HCL 2.5; 5 MG/1; MG/1
TABLET, FILM COATED ORAL
Qty: 360 TABLET | Refills: 4 | Status: SHIPPED | OUTPATIENT
Start: 2019-09-30 | End: 2020-12-23

## 2019-10-02 ENCOUNTER — TELEPHONE (OUTPATIENT)
Dept: FAMILY MEDICINE CLINIC | Facility: CLINIC | Age: 49
End: 2019-10-02

## 2019-10-02 DIAGNOSIS — E11.9 TYPE 2 DIABETES MELLITUS WITHOUT COMPLICATION, WITHOUT LONG-TERM CURRENT USE OF INSULIN (HCC): Primary | ICD-10-CM

## 2019-10-29 LAB — HBA1C MFR BLD HPLC: 8.5 %

## 2019-10-31 ENCOUNTER — OFFICE VISIT (OUTPATIENT)
Dept: FAMILY MEDICINE CLINIC | Facility: CLINIC | Age: 49
End: 2019-10-31
Payer: COMMERCIAL

## 2019-10-31 VITALS
HEIGHT: 70 IN | DIASTOLIC BLOOD PRESSURE: 82 MMHG | HEART RATE: 73 BPM | SYSTOLIC BLOOD PRESSURE: 134 MMHG | BODY MASS INDEX: 36.22 KG/M2 | OXYGEN SATURATION: 97 % | RESPIRATION RATE: 14 BRPM | WEIGHT: 253 LBS

## 2019-10-31 DIAGNOSIS — G47.33 OBSTRUCTIVE SLEEP APNEA: ICD-10-CM

## 2019-10-31 DIAGNOSIS — E11.65 TYPE 2 DIABETES MELLITUS WITH HYPERGLYCEMIA, WITHOUT LONG-TERM CURRENT USE OF INSULIN (HCC): ICD-10-CM

## 2019-10-31 DIAGNOSIS — E11.9 TYPE 2 DIABETES MELLITUS WITHOUT COMPLICATION, WITH LONG-TERM CURRENT USE OF INSULIN (HCC): Primary | ICD-10-CM

## 2019-10-31 DIAGNOSIS — I10 ESSENTIAL HYPERTENSION: ICD-10-CM

## 2019-10-31 DIAGNOSIS — E66.01 MORBID OBESITY (HCC): ICD-10-CM

## 2019-10-31 DIAGNOSIS — Z79.4 TYPE 2 DIABETES MELLITUS WITHOUT COMPLICATION, WITH LONG-TERM CURRENT USE OF INSULIN (HCC): Primary | ICD-10-CM

## 2019-10-31 DIAGNOSIS — E78.5 HYPERLIPIDEMIA, UNSPECIFIED HYPERLIPIDEMIA TYPE: ICD-10-CM

## 2019-10-31 PROCEDURE — 3008F BODY MASS INDEX DOCD: CPT | Performed by: FAMILY MEDICINE

## 2019-10-31 PROCEDURE — 3075F SYST BP GE 130 - 139MM HG: CPT | Performed by: FAMILY MEDICINE

## 2019-10-31 PROCEDURE — 99214 OFFICE O/P EST MOD 30 MIN: CPT | Performed by: FAMILY MEDICINE

## 2019-10-31 PROCEDURE — 3079F DIAST BP 80-89 MM HG: CPT | Performed by: FAMILY MEDICINE

## 2019-10-31 RX ORDER — VALSARTAN AND HYDROCHLOROTHIAZIDE 320; 25 MG/1; MG/1
1 TABLET, FILM COATED ORAL
COMMUNITY
End: 2020-02-03

## 2019-10-31 NOTE — PROGRESS NOTES
Assessment/Plan:    No problem-specific Assessment & Plan notes found for this encounter  Diagnoses and all orders for this visit:    Type 2 diabetes mellitus without complication, with long-term current use of insulin (HCC)  -     Phentermine-Topiramate (QSYMIA) 3 75-23 MG CP24; Take 1 tablet by mouth daily    Type 2 diabetes mellitus with hyperglycemia, without long-term current use of insulin (HCC)  -     Phentermine-Topiramate (QSYMIA) 3 75-23 MG CP24; Take 1 tablet by mouth daily    Morbid obesity (HCC)  -     Phentermine-Topiramate (QSYMIA) 3 75-23 MG CP24; Take 1 tablet by mouth daily    Essential hypertension  -     Echo complete with contrast if indicated; Future    Hyperlipidemia, unspecified hyperlipidemia type    Obstructive sleep apnea  -     Echo complete with contrast if indicated; Future    Other orders  -     valsartan-hydrochlorothiazide (DIOVAN-HCT) 320-25 MG per tablet; Take 1 tablet by mouth          Subjective:      Patient ID: Mere Denson is a 44 y o  male  His A1c is not at goal although it is better than previous values  He is on maximum doses of for medications  We are trying to hold off on insulin due to his driving requirements  His blood pressure is at goal on his current regimen which includes an angiotensin receptor blocker  He has no headache or vision changes  He has no chest pain or shortness of breath  His lipids are at goal on his current regimen  Admittedly, this is not high-intensity statin therapy  I am reluctant to change his regimen given his lipid panel and tolerability of his medication  The following portions of the patient's history were reviewed and updated as appropriate:   He  has a past medical history of Asthma (01/17/2013), De Quervain's tenosynovitis, right (3/23/2017), [facility] ([facility] ), Hyperlipidemia, Hypertension, and Sleep apnea    He   Patient Active Problem List    Diagnosis Date Noted    Encounter for diabetic foot exam ([facility] ) 02/05/2019    De Quervain's tenosynovitis, right 03/23/2017    Obstructive sleep apnea 08/28/2015    Type 2 diabetes mellitus with hyperglycemia ([facility] ) 05/30/2013    Hyperlipidemia 01/17/2013    Hypertension 01/17/2013     He  has a past surgical history that includes Rotator cuff repair (Right); Clavicle surgery; Inguinal hernia repair (Left); pr incis tendon sheath,radial styloid (Right, 3/23/2017); and Orchiectomy (Left)  His family history includes Asthma in his mother; Atrial fibrillation in his father; Diabetes in his mother  He  reports that he has never smoked  He has never used smokeless tobacco  He reports that he drinks alcohol  He reports that he does not use drugs  Current Outpatient Medications   Medication Sig Dispense Refill    Canagliflozin (INVOKANA) 300 MG TABS Take 1 tablet (300 mg total) by mouth daily 90 tablet 3    clotrimazole-betamethasone (LOTRISONE) 1-0 05 % cream Apply topically 2 (two) times a day 30 g 0    glipiZIDE-metFORMIN (METAGLIP) 2 5-500 MG per tablet TAKE 2 TABLETS TWICE A  tablet 4    JANUVIA 100 MG tablet TAKE 1 TABLET DAILY 90 tablet 4    metoprolol succinate (TOPROL-XL) 50 mg 24 hr tablet TAKE 1 TABLET DAILY 2700 tablet 2    olmesartan-hydrochlorothiazide (BENICAR HCT) 40-25 MG per tablet TAKE 1 TABLET DAILY 90 tablet 4    Phentermine-Topiramate (QSYMIA) 3 75-23 MG CP24 Take 1 tablet by mouth daily 30 capsule 5    simvastatin (ZOCOR) 20 mg tablet TAKE 1 TABLET DAILY IN THE EVENING 90 tablet 3    valsartan-hydrochlorothiazide (DIOVAN-HCT) 320-25 MG per tablet Take 1 tablet by mouth       No current facility-administered medications for this visit        Current Outpatient Medications on File Prior to Visit   Medication Sig    Canagliflozin (INVOKANA) 300 MG TABS Take 1 tablet (300 mg total) by mouth daily    clotrimazole-betamethasone (LOTRISONE) 1-0 05 % cream Apply topically 2 (two) times a day    glipiZIDE-metFORMIN (METAGLIP) 2 5-500 MG per tablet TAKE 2 TABLETS TWICE A DAY    JANUVIA 100 MG tablet TAKE 1 TABLET DAILY    metoprolol succinate (TOPROL-XL) 50 mg 24 hr tablet TAKE 1 TABLET DAILY    olmesartan-hydrochlorothiazide (BENICAR HCT) 40-25 MG per tablet TAKE 1 TABLET DAILY    simvastatin (ZOCOR) 20 mg tablet TAKE 1 TABLET DAILY IN THE EVENING    valsartan-hydrochlorothiazide (DIOVAN-HCT) 320-25 MG per tablet Take 1 tablet by mouth    [DISCONTINUED] aspirin 81 MG tablet Take 81 mg by mouth daily    [DISCONTINUED] sitaGLIPtin (JANUVIA) 50 mg tablet Take 50 mg by mouth     No current facility-administered medications on file prior to visit  He has No Known Allergies       Review of Systems   All other systems reviewed and are negative  Objective:      /82   Pulse 73   Resp 14   Ht 5' 10" (1 778 m)   Wt 115 kg (253 lb)   SpO2 97%   BMI 36 30 kg/m²          Physical Exam   Constitutional: He is oriented to person, place, and time  He appears well-developed and well-nourished  Neck: Normal range of motion  Neck supple  Cardiovascular: Normal rate, regular rhythm, normal heart sounds and intact distal pulses  Pulmonary/Chest: Effort normal and breath sounds normal    Abdominal: Soft  Bowel sounds are normal    Musculoskeletal: Normal range of motion  Neurological: He is alert and oriented to person, place, and time  Skin: Skin is warm and dry  Psychiatric: He has a normal mood and affect  His behavior is normal  Judgment and thought content normal    Nursing note and vitals reviewed

## 2020-01-24 DIAGNOSIS — E11.65 TYPE 2 DIABETES MELLITUS WITH HYPERGLYCEMIA, WITHOUT LONG-TERM CURRENT USE OF INSULIN (HCC): ICD-10-CM

## 2020-01-24 RX ORDER — CANAGLIFLOZIN 300 MG/1
TABLET, FILM COATED ORAL
Qty: 90 TABLET | Refills: 0 | Status: SHIPPED | OUTPATIENT
Start: 2020-01-24 | End: 2020-04-27

## 2020-02-03 ENCOUNTER — OFFICE VISIT (OUTPATIENT)
Dept: FAMILY MEDICINE CLINIC | Facility: CLINIC | Age: 50
End: 2020-02-03
Payer: COMMERCIAL

## 2020-02-03 VITALS
HEIGHT: 70 IN | SYSTOLIC BLOOD PRESSURE: 132 MMHG | DIASTOLIC BLOOD PRESSURE: 80 MMHG | WEIGHT: 264 LBS | BODY MASS INDEX: 37.8 KG/M2

## 2020-02-03 DIAGNOSIS — I10 ESSENTIAL HYPERTENSION: ICD-10-CM

## 2020-02-03 DIAGNOSIS — Q23.1 BICUSPID AORTIC VALVE: ICD-10-CM

## 2020-02-03 DIAGNOSIS — E78.5 HYPERLIPIDEMIA, UNSPECIFIED HYPERLIPIDEMIA TYPE: ICD-10-CM

## 2020-02-03 DIAGNOSIS — E11.65 TYPE 2 DIABETES MELLITUS WITH HYPERGLYCEMIA, WITHOUT LONG-TERM CURRENT USE OF INSULIN (HCC): Primary | ICD-10-CM

## 2020-02-03 LAB — SL AMB POCT HEMOGLOBIN AIC: 7 (ref ?–6.5)

## 2020-02-03 PROCEDURE — 1036F TOBACCO NON-USER: CPT | Performed by: FAMILY MEDICINE

## 2020-02-03 PROCEDURE — 3075F SYST BP GE 130 - 139MM HG: CPT | Performed by: FAMILY MEDICINE

## 2020-02-03 PROCEDURE — 3079F DIAST BP 80-89 MM HG: CPT | Performed by: FAMILY MEDICINE

## 2020-02-03 PROCEDURE — 83036 HEMOGLOBIN GLYCOSYLATED A1C: CPT | Performed by: FAMILY MEDICINE

## 2020-02-03 PROCEDURE — 99214 OFFICE O/P EST MOD 30 MIN: CPT | Performed by: FAMILY MEDICINE

## 2020-02-03 PROCEDURE — 3051F HG A1C>EQUAL 7.0%<8.0%: CPT | Performed by: FAMILY MEDICINE

## 2020-02-03 PROCEDURE — 3008F BODY MASS INDEX DOCD: CPT | Performed by: FAMILY MEDICINE

## 2020-02-03 RX ORDER — IBUPROFEN 200 MG
TABLET ORAL
COMMUNITY
Start: 2017-10-03

## 2020-02-03 RX ORDER — SIMVASTATIN 20 MG
20 TABLET ORAL EVERY EVENING
Qty: 90 TABLET | Refills: 3 | Status: SHIPPED | OUTPATIENT
Start: 2020-02-03 | End: 2020-05-04

## 2020-02-03 NOTE — PROGRESS NOTES
Assessment/Plan:    Type 2 diabetes mellitus with hyperglycemia [facility])    Lab Results   Component Value Date    HGBA1C 8 5 10/29/2019   Great improvement  Continue current  Hypertension  Stable  Continue current  Hyperlipidemia  Stable  Continue current  Diagnoses and all orders for this visit:    Type 2 diabetes mellitus with hyperglycemia, without long-term current use of insulin ([facility])    Essential hypertension  -     simvastatin (ZOCOR) 20 mg tablet; Take 1 tablet (20 mg total) by mouth every evening    Hyperlipidemia, unspecified hyperlipidemia type    Bicuspid aortic valve  -     Echo complete with contrast if indicated; Future    Other orders  -     ibuprofen (MOTRIN) 200 mg tablet          Subjective:      Patient ID: Jerryl Osgood is a 44 y o  male  His blood pressure is at goal on his current regimen  He has no chest pain or shortness of breath  He has no headache or vision changes  He has no PND, orthopnea, or dyspnea on exertion  There is suspicion of bicuspid aortic valve  He is due for TTE  His lipid panel is at goal on 20 mg of Zocor  Admittedly, this is not "high-intensity statin therapy "  Regardless, his LDL is 50 mg/dL, his liver function testing is within normal limits, and he has no side effects  His A1c is down to 7 0%  In the office today  He has no side effects or hypoglycemia  He is using his CPAP daily and benefits from its use  He has less fatigue  He denies HA or vision changes  He has more restful sleep  The following portions of the patient's history were reviewed and updated as appropriate:   He  has a past medical history of Asthma (01/17/2013), De Quervain's tenosynovitis, right (3/23/2017), [facility] mellitus ([facility] ), Hyperlipidemia, Hypertension, and Sleep apnea    He   Patient Active Problem List    Diagnosis Date Noted    [facility]    De Quervain's tenosynovitis, right 03/23/2017    Obstructive sleep apnea 08/28/2015    Type 2 diabetes mellitus with hyperglycemia ([facility] ) 05/30/2013    Hyperlipidemia 01/17/2013    Hypertension 01/17/2013     He  has a past surgical history that includes Rotator cuff repair (Right); Clavicle surgery; Inguinal hernia repair (Left); pr incis tendon sheath,radial styloid (Right, 3/23/2017); and Orchiectomy (Left)  His family history includes Asthma in his mother; Atrial fibrillation in his father; Diabetes in his mother  He  reports that he has never smoked  He has never used smokeless tobacco  He reports that he drinks alcohol  He reports that he does not use drugs  Current Outpatient Medications   Medication Sig Dispense Refill    glipiZIDE-metFORMIN (METAGLIP) 2 5-500 MG per tablet TAKE 2 TABLETS TWICE A  tablet 4    ibuprofen (MOTRIN) 200 mg tablet       INVOKANA 300 MG TABS TAKE 1 TABLET DAILY 90 tablet 0    metoprolol succinate (TOPROL-XL) 50 mg 24 hr tablet TAKE 1 TABLET DAILY 2700 tablet 2    olmesartan-hydrochlorothiazide (BENICAR HCT) 40-25 MG per tablet TAKE 1 TABLET DAILY 90 tablet 4    simvastatin (ZOCOR) 20 mg tablet Take 1 tablet (20 mg total) by mouth every evening 90 tablet 3     No current facility-administered medications for this visit        Current Outpatient Medications on File Prior to Visit   Medication Sig    glipiZIDE-metFORMIN (METAGLIP) 2 5-500 MG per tablet TAKE 2 TABLETS TWICE A DAY    ibuprofen (MOTRIN) 200 mg tablet     INVOKANA 300 MG TABS TAKE 1 TABLET DAILY    metoprolol succinate (TOPROL-XL) 50 mg 24 hr tablet TAKE 1 TABLET DAILY    olmesartan-hydrochlorothiazide (BENICAR HCT) 40-25 MG per tablet TAKE 1 TABLET DAILY    [DISCONTINUED] JANUVIA 100 MG tablet TAKE 1 TABLET DAILY    [DISCONTINUED] simvastatin (ZOCOR) 20 mg tablet TAKE 1 TABLET DAILY IN THE EVENING    [DISCONTINUED] valsartan-hydrochlorothiazide (DIOVAN-HCT) 320-25 MG per tablet Take 1 tablet by mouth    [DISCONTINUED] aspirin 81 MG tablet Take 81 mg by mouth daily    [DISCONTINUED] clotrimazole-betamethasone (LOTRISONE) 1-0 05 % cream Apply topically 2 (two) times a day (Patient not taking: Reported on 2/3/2020)    [DISCONTINUED] Phentermine-Topiramate (QSYMIA) 3 75-23 MG CP24 Take 1 tablet by mouth daily (Patient not taking: Reported on 2/3/2020)     No current facility-administered medications on file prior to visit  He has No Known Allergies       Review of Systems   All other systems reviewed and are negative  Objective:      /80   Ht 5' 10" (1 778 m)   Wt 120 kg (264 lb)   BMI 37 88 kg/m²          Physical Exam   Constitutional: He is oriented to person, place, and time  He appears well-developed and well-nourished  Neck: Normal range of motion  Neck supple  Cardiovascular: Normal rate, regular rhythm, normal heart sounds and intact distal pulses  Pulmonary/Chest: Effort normal and breath sounds normal    Abdominal: Soft  Bowel sounds are normal    Musculoskeletal: Normal range of motion  Neurological: He is alert and oriented to person, place, and time  Skin: Skin is warm and dry  Capillary refill takes less than 2 seconds  Psychiatric: He has a normal mood and affect  His behavior is normal  Judgment and thought content normal    Nursing note and vitals reviewed

## 2020-02-03 NOTE — PROGRESS NOTES
BMI Counseling: Body mass index is 37 88 kg/m²  The BMI is above normal  Nutrition recommendations include reducing portion sizes, decreasing overall calorie intake, 3-5 servings of fruits/vegetables daily, reducing fast food intake, consuming healthier snacks, decreasing soda and/or juice intake, moderation in carbohydrate intake, increasing intake of lean protein, reducing intake of saturated fat and trans fat and reducing intake of cholesterol  Exercise recommendations include moderate aerobic physical activity for 150 minutes/week, vigorous aerobic physical activity for 75 minutes/week, exercising 3-5 times per week, joining a gym and strength training exercises

## 2020-02-26 ENCOUNTER — TELEPHONE (OUTPATIENT)
Dept: FAMILY MEDICINE CLINIC | Facility: CLINIC | Age: 50
End: 2020-02-26

## 2020-03-02 DIAGNOSIS — I10 ESSENTIAL HYPERTENSION: ICD-10-CM

## 2020-03-02 RX ORDER — METOPROLOL SUCCINATE 50 MG/1
TABLET, EXTENDED RELEASE ORAL
Qty: 90 TABLET | Refills: 4 | Status: SHIPPED | OUTPATIENT
Start: 2020-03-02 | End: 2021-05-26

## 2020-04-24 DIAGNOSIS — E11.65 TYPE 2 DIABETES MELLITUS WITH HYPERGLYCEMIA, WITHOUT LONG-TERM CURRENT USE OF INSULIN (HCC): ICD-10-CM

## 2020-04-27 RX ORDER — CANAGLIFLOZIN 300 MG/1
TABLET, FILM COATED ORAL
Qty: 90 TABLET | Refills: 3 | Status: SHIPPED | OUTPATIENT
Start: 2020-04-27 | End: 2021-04-18

## 2020-05-04 DIAGNOSIS — I10 ESSENTIAL HYPERTENSION: ICD-10-CM

## 2020-05-04 RX ORDER — SIMVASTATIN 20 MG
TABLET ORAL
Qty: 90 TABLET | Refills: 3 | Status: SHIPPED | OUTPATIENT
Start: 2020-05-04 | End: 2021-04-24

## 2020-11-11 DIAGNOSIS — I10 ESSENTIAL HYPERTENSION: ICD-10-CM

## 2020-11-11 RX ORDER — OLMESARTAN MEDOXOMIL AND HYDROCHLOROTHIAZIDE 40/25 40; 25 MG/1; MG/1
TABLET ORAL
Qty: 90 TABLET | Refills: 3 | Status: SHIPPED | OUTPATIENT
Start: 2020-11-11 | End: 2021-11-08

## 2020-12-09 DIAGNOSIS — Z79.4 TYPE 2 DIABETES MELLITUS WITHOUT COMPLICATION, WITH LONG-TERM CURRENT USE OF INSULIN (HCC): ICD-10-CM

## 2020-12-09 DIAGNOSIS — E11.9 TYPE 2 DIABETES MELLITUS WITHOUT COMPLICATION, WITH LONG-TERM CURRENT USE OF INSULIN (HCC): ICD-10-CM

## 2020-12-09 RX ORDER — SITAGLIPTIN 100 MG/1
TABLET, FILM COATED ORAL
Qty: 90 TABLET | Refills: 3 | Status: SHIPPED | OUTPATIENT
Start: 2020-12-09 | End: 2022-01-07

## 2020-12-23 DIAGNOSIS — E11.9 TYPE 2 DIABETES MELLITUS WITHOUT COMPLICATION, WITHOUT LONG-TERM CURRENT USE OF INSULIN (HCC): ICD-10-CM

## 2020-12-23 RX ORDER — GLIPIZIDE AND METFORMIN HCL 2.5; 5 MG/1; MG/1
TABLET, FILM COATED ORAL
Qty: 360 TABLET | Refills: 3 | Status: SHIPPED | OUTPATIENT
Start: 2020-12-23 | End: 2021-11-30

## 2021-01-08 ENCOUNTER — OFFICE VISIT (OUTPATIENT)
Dept: FAMILY MEDICINE CLINIC | Facility: CLINIC | Age: 51
End: 2021-01-08
Payer: COMMERCIAL

## 2021-01-08 VITALS
HEIGHT: 70 IN | WEIGHT: 240 LBS | TEMPERATURE: 97.9 F | OXYGEN SATURATION: 97 % | BODY MASS INDEX: 34.36 KG/M2 | SYSTOLIC BLOOD PRESSURE: 130 MMHG | DIASTOLIC BLOOD PRESSURE: 72 MMHG | HEART RATE: 70 BPM

## 2021-01-08 DIAGNOSIS — E11.9 TYPE 2 DIABETES MELLITUS WITHOUT COMPLICATION, WITHOUT LONG-TERM CURRENT USE OF INSULIN (HCC): Primary | ICD-10-CM

## 2021-01-08 DIAGNOSIS — I10 ESSENTIAL HYPERTENSION: ICD-10-CM

## 2021-01-08 DIAGNOSIS — E11.65 TYPE 2 DIABETES MELLITUS WITH HYPERGLYCEMIA, WITHOUT LONG-TERM CURRENT USE OF INSULIN (HCC): ICD-10-CM

## 2021-01-08 DIAGNOSIS — G47.33 OBSTRUCTIVE SLEEP APNEA: ICD-10-CM

## 2021-01-08 DIAGNOSIS — E78.5 HYPERLIPIDEMIA, UNSPECIFIED HYPERLIPIDEMIA TYPE: ICD-10-CM

## 2021-01-08 LAB — SL AMB POCT HEMOGLOBIN AIC: 7.8 (ref ?–6.5)

## 2021-01-08 PROCEDURE — 1036F TOBACCO NON-USER: CPT | Performed by: FAMILY MEDICINE

## 2021-01-08 PROCEDURE — 3075F SYST BP GE 130 - 139MM HG: CPT | Performed by: FAMILY MEDICINE

## 2021-01-08 PROCEDURE — 3078F DIAST BP <80 MM HG: CPT | Performed by: FAMILY MEDICINE

## 2021-01-08 PROCEDURE — 3008F BODY MASS INDEX DOCD: CPT | Performed by: FAMILY MEDICINE

## 2021-01-08 PROCEDURE — 83036 HEMOGLOBIN GLYCOSYLATED A1C: CPT | Performed by: FAMILY MEDICINE

## 2021-01-08 PROCEDURE — 3051F HG A1C>EQUAL 7.0%<8.0%: CPT | Performed by: FAMILY MEDICINE

## 2021-01-08 PROCEDURE — 3725F SCREEN DEPRESSION PERFORMED: CPT | Performed by: FAMILY MEDICINE

## 2021-01-08 PROCEDURE — 99214 OFFICE O/P EST MOD 30 MIN: CPT | Performed by: FAMILY MEDICINE

## 2021-01-08 NOTE — PROGRESS NOTES
Diabetic Foot Exam    Patient's shoes and socks removed  Right Foot/Ankle   Right Foot Inspection  Skin Exam: skin normal and skin intact no dry skin, no warmth, no callus, no erythema, no maceration, no abnormal color, no pre-ulcer, no ulcer and no callus                          Toe Exam: ROM and strength within normal limits  Sensory   Vibration: intact  Proprioception: intact   Monofilament testing: intact  Vascular  Capillary refills: < 3 seconds  The right DP pulse is 2+  The right PT pulse is 2+  Left Foot/Ankle  Left Foot Inspection  Skin Exam: skin normal and skin intactno dry skin, no warmth, no erythema, no maceration, normal color, no pre-ulcer, no ulcer and no callus                         Toe Exam: ROM and strength within normal limits                   Sensory   Vibration: intact  Proprioception: intact  Monofilament: intact  Vascular  Capillary refills: < 3 seconds  The left DP pulse is 2+  The left PT pulse is 2+  Assign Risk Category:  No deformity present; No loss of protective sensation; No weak pulses       Risk: 0  Assessment/Plan:    De Quervain's tenosynovitis, right  Resolved  Type 2 diabetes mellitus with hyperglycemia ([facility])    Lab Results   Component Value Date    HGBA1C 7 8 (A) 01/08/2021     Stable  Continue current  Obstructive sleep apnea  Stable  Continue current  Hypertension  Stable  Continue current  Hyperlipidemia  Stable  Continue current  Diagnoses and all orders for this visit:    Type 2 diabetes mellitus without complication, without long-term current use of insulin ([facility])  -     POCT hemoglobin A1c  -     Lipid panel; Future  -     Comprehensive metabolic panel; Future  -     TSH, 3rd generation;  Future    Type 2 diabetes mellitus with hyperglycemia, without long-term current use of insulin ([facility] )    Obstructive sleep apnea    Essential hypertension    Hyperlipidemia, unspecified hyperlipidemia type          Subjective:      Patient ID: Kayla Shanksefren Aleida Coats is a 44 y o  male  Blood pressure is under good control in the office today  He is on 3 agents for hypertension:  Metoprolol, Benicar, and hydrochlorothiazide  He has no chest pain or shortness of breath  He has no headache or vision changes  He has no PND, orthopnea, or dyspnea on exertion  He has no episodes of hypotension  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  He has type 2 diabetes mellitus  His hemoglobin A1c is 7 8 percent in the office today  He has had no hypoglycemic events  He is currently taking metformin, Januvia, Invokana, and glipizide  He is not on and GLP-1 or insulin  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  He has hyperlipidemia  He is due for a lipid panel  He is on simvastatin 20 mg  Although this is not high-intensity statin therapy,  his LDL has been at goal and he has no side effects from his current regimen  He has obstructive sleep apnea  He has been compliant with his CPAP machine  I reviewed his CPAP data today  He has no daytime somnolence  There has been no history of him falling asleep at inappropriate time  Based on the compliance data he presents today, 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  The following portions of the patient's history were reviewed and updated as appropriate:   He  has a past medical history of Asthma (01/17/2013), De Quervain's tenosynovitis, right (3/23/2017), Diabetes mellitus ([facility] ), Hyperlipidemia, Hypertension, and Sleep apnea    He   Patient Active Problem List    Diagnosis Date Noted    [facility]counter [facility] foot exam ([facility] ) 02/05/2019    De Quervain's tenosynovitis, right 03/23/2017    Obstructive sleep apnea 08/28/2015    Type 2 diabetes mellitus with hyperglycemia ([facility] ) 05/30/2013    Hyperlipidemia 01/17/2013    Hypertension 01/17/2013     He  has a past surgical history that includes Rotator cuff repair (Right); Clavicle surgery; Inguinal hernia repair (Left); pr incis tendon sheath,radial styloid (Right, 3/23/2017); and Orchiectomy (Left)  His family history includes Asthma in his mother; Atrial fibrillation in his father; Diabetes in his mother  He  reports that he has never smoked  He has never used smokeless tobacco  He reports current alcohol use  He reports that he does not use drugs  Current Outpatient Medications   Medication Sig Dispense Refill    glipiZIDE-metFORMIN (METAGLIP) 2 5-500 MG per tablet TAKE 2 TABLETS TWICE A  tablet 3    glucose blood (FREESTYLE LITE) test strip Test daily  100 each 5    ibuprofen (MOTRIN) 200 mg tablet       INVOKANA 300 MG TABS TAKE 1 TABLET DAILY 90 tablet 3    Januvia 100 MG tablet TAKE 1 TABLET DAILY 90 tablet 3    metoprolol succinate (TOPROL-XL) 50 mg 24 hr tablet TAKE 1 TABLET DAILY 90 tablet 4    olmesartan-hydrochlorothiazide (BENICAR HCT) 40-25 MG per tablet TAKE 1 TABLET DAILY 90 tablet 3    simvastatin (ZOCOR) 20 mg tablet TAKE 1 TABLET DAILY IN THE EVENING 90 tablet 3     No current facility-administered medications for this visit  Current Outpatient Medications on File Prior to Visit   Medication Sig    glipiZIDE-metFORMIN (METAGLIP) 2 5-500 MG per tablet TAKE 2 TABLETS TWICE A DAY    glucose blood (FREESTYLE LITE) test strip Test daily   ibuprofen (MOTRIN) 200 mg tablet     INVOKANA 300 MG TABS TAKE 1 TABLET DAILY    Januvia 100 MG tablet TAKE 1 TABLET DAILY    metoprolol succinate (TOPROL-XL) 50 mg 24 hr tablet TAKE 1 TABLET DAILY    olmesartan-hydrochlorothiazide (BENICAR HCT) 40-25 MG per tablet TAKE 1 TABLET DAILY    simvastatin (ZOCOR) 20 mg tablet TAKE 1 TABLET DAILY IN THE EVENING    [DISCONTINUED] aspirin 81 MG tablet Take 81 mg by mouth daily     No current facility-administered medications on file prior to visit  He has No Known Allergies       Review of Systems   All other systems reviewed and are negative  Objective:      /72   Pulse 70   Temp 97 9 °F (36 6 °C)   Ht 5' 10" (1 778 m)   Wt 109 kg (240 lb)   SpO2 97%   BMI 34 44 kg/m²          Physical Exam  Vitals signs and nursing note reviewed  Constitutional:       Appearance: Normal appearance  He is obese  HENT:      Head: Normocephalic and atraumatic  Neck:      Musculoskeletal: Normal range of motion and neck supple  Cardiovascular:      Rate and Rhythm: Normal rate and regular rhythm  Pulses: Normal pulses  no weak pulses          Dorsalis pedis pulses are 2+ on the right side and 2+ on the left side  Posterior tibial pulses are 2+ on the right side and 2+ on the left side  Heart sounds: Normal heart sounds  Pulmonary:      Effort: Pulmonary effort is normal       Breath sounds: Normal breath sounds  Abdominal:      General: Abdomen is flat  Bowel sounds are normal       Palpations: Abdomen is soft  Musculoskeletal: Normal range of motion  Feet:      Right foot:      Skin integrity: No ulcer, skin breakdown, erythema, warmth, callus or dry skin  Left foot:      Skin integrity: No ulcer, skin breakdown, erythema, warmth, callus or dry skin  Skin:     General: Skin is warm and dry  Capillary Refill: Capillary refill takes less than 2 seconds  Neurological:      General: No focal deficit present  Mental Status: He is alert and oriented to person, place, and time  Mental status is at baseline  Psychiatric:         Mood and Affect: Mood normal          Behavior: Behavior normal          Thought Content:  Thought content normal          Judgment: Judgment normal

## 2021-04-18 DIAGNOSIS — E11.65 TYPE 2 DIABETES MELLITUS WITH HYPERGLYCEMIA, WITHOUT LONG-TERM CURRENT USE OF INSULIN (HCC): ICD-10-CM

## 2021-04-18 RX ORDER — CANAGLIFLOZIN 300 MG/1
TABLET, FILM COATED ORAL
Qty: 90 TABLET | Refills: 3 | Status: SHIPPED | OUTPATIENT
Start: 2021-04-18 | End: 2022-04-13

## 2021-04-24 DIAGNOSIS — I10 ESSENTIAL HYPERTENSION: ICD-10-CM

## 2021-04-24 RX ORDER — SIMVASTATIN 20 MG
TABLET ORAL
Qty: 90 TABLET | Refills: 3 | Status: SHIPPED | OUTPATIENT
Start: 2021-04-24 | End: 2022-04-19

## 2021-05-06 ENCOUNTER — VBI (OUTPATIENT)
Dept: ADMINISTRATIVE | Facility: OTHER | Age: 51
End: 2021-05-06

## 2021-05-26 DIAGNOSIS — I10 ESSENTIAL HYPERTENSION: ICD-10-CM

## 2021-05-26 RX ORDER — METOPROLOL SUCCINATE 50 MG/1
TABLET, EXTENDED RELEASE ORAL
Qty: 90 TABLET | Refills: 3 | Status: SHIPPED | OUTPATIENT
Start: 2021-05-26 | End: 2022-04-28

## 2021-07-02 ENCOUNTER — RA CDI HCC (OUTPATIENT)
Dept: OTHER | Facility: HOSPITAL | Age: 51
End: 2021-07-02

## 2021-07-02 NOTE — PROGRESS NOTES
[facility]  coding opportunities          Chart reviewed, no opportunity found: CHART REVIEWED, NO OPPORTUNITY FOUND                     Patients insurance company:  [employer] [facility] (Medicare Advantage and Commercial)

## 2021-10-27 ENCOUNTER — VBI (OUTPATIENT)
Dept: ADMINISTRATIVE | Facility: OTHER | Age: 51
End: 2021-10-27

## 2021-11-08 DIAGNOSIS — I10 ESSENTIAL HYPERTENSION: ICD-10-CM

## 2021-11-08 RX ORDER — OLMESARTAN MEDOXOMIL AND HYDROCHLOROTHIAZIDE 40/25 40; 25 MG/1; MG/1
TABLET ORAL
Qty: 90 TABLET | Refills: 3 | Status: SHIPPED | OUTPATIENT
Start: 2021-11-08

## 2021-11-30 DIAGNOSIS — E11.9 TYPE 2 DIABETES MELLITUS WITHOUT COMPLICATION, WITHOUT LONG-TERM CURRENT USE OF INSULIN (HCC): ICD-10-CM

## 2021-11-30 RX ORDER — GLIPIZIDE AND METFORMIN HCL 2.5; 5 MG/1; MG/1
TABLET, FILM COATED ORAL
Qty: 360 TABLET | Refills: 3 | Status: SHIPPED | OUTPATIENT
Start: 2021-11-30

## 2022-01-05 ENCOUNTER — APPOINTMENT (OUTPATIENT)
Dept: LAB | Facility: CLINIC | Age: 52
End: 2022-01-05
Payer: COMMERCIAL

## 2022-01-05 DIAGNOSIS — E11.9 TYPE 2 DIABETES MELLITUS WITHOUT COMPLICATION, WITHOUT LONG-TERM CURRENT USE OF INSULIN (HCC): ICD-10-CM

## 2022-01-05 PROCEDURE — 80053 COMPREHEN METABOLIC PANEL: CPT

## 2022-01-05 PROCEDURE — 36415 COLL VENOUS BLD VENIPUNCTURE: CPT

## 2022-01-05 PROCEDURE — 82570 ASSAY OF URINE CREATININE: CPT | Performed by: FAMILY MEDICINE

## 2022-01-05 PROCEDURE — 80061 LIPID PANEL: CPT

## 2022-01-05 PROCEDURE — 82043 UR ALBUMIN QUANTITATIVE: CPT | Performed by: FAMILY MEDICINE

## 2022-01-05 PROCEDURE — 84443 ASSAY THYROID STIM HORMONE: CPT

## 2022-01-06 LAB
ALBUMIN SERPL BCP-MCNC: 4.4 G/DL (ref 3.5–5)
ALP SERPL-CCNC: 68 U/L (ref 46–116)
ALT SERPL W P-5'-P-CCNC: 77 U/L (ref 12–78)
ANION GAP SERPL CALCULATED.3IONS-SCNC: 7 MMOL/L (ref 4–13)
AST SERPL W P-5'-P-CCNC: 29 U/L (ref 5–45)
BILIRUB SERPL-MCNC: 0.81 MG/DL (ref 0.2–1)
BUN SERPL-MCNC: 15 MG/DL (ref 5–25)
CALCIUM SERPL-MCNC: 9.8 MG/DL (ref 8.3–10.1)
CHLORIDE SERPL-SCNC: 100 MMOL/L (ref 100–108)
CHOLEST SERPL-MCNC: 132 MG/DL
CO2 SERPL-SCNC: 28 MMOL/L (ref 21–32)
CREAT SERPL-MCNC: 1.05 MG/DL (ref 0.6–1.3)
CREAT UR-MCNC: 83.6 MG/DL
GFR SERPL CREATININE-BSD FRML MDRD: 80 ML/MIN/1.73SQ M
GLUCOSE P FAST SERPL-MCNC: 117 MG/DL (ref 65–99)
HDLC SERPL-MCNC: 35 MG/DL
LDLC SERPL CALC-MCNC: 47 MG/DL (ref 0–100)
MICROALBUMIN UR-MCNC: 12.1 MG/L (ref 0–20)
MICROALBUMIN/CREAT 24H UR: 14 MG/G CREATININE (ref 0–30)
NONHDLC SERPL-MCNC: 97 MG/DL
POTASSIUM SERPL-SCNC: 3.7 MMOL/L (ref 3.5–5.3)
PROT SERPL-MCNC: 8 G/DL (ref 6.4–8.2)
SODIUM SERPL-SCNC: 135 MMOL/L (ref 136–145)
TRIGL SERPL-MCNC: 251 MG/DL
TSH SERPL DL<=0.05 MIU/L-ACNC: 1.82 UIU/ML (ref 0.36–3.74)

## 2022-01-06 PROCEDURE — 3061F NEG MICROALBUMINURIA REV: CPT | Performed by: FAMILY MEDICINE

## 2022-01-07 ENCOUNTER — OFFICE VISIT (OUTPATIENT)
Dept: FAMILY MEDICINE CLINIC | Facility: CLINIC | Age: 52
End: 2022-01-07
Payer: COMMERCIAL

## 2022-01-07 VITALS
HEIGHT: 70 IN | TEMPERATURE: 97.9 F | DIASTOLIC BLOOD PRESSURE: 78 MMHG | SYSTOLIC BLOOD PRESSURE: 138 MMHG | OXYGEN SATURATION: 97 % | HEART RATE: 71 BPM | BODY MASS INDEX: 34.5 KG/M2 | WEIGHT: 241 LBS

## 2022-01-07 DIAGNOSIS — Z11.4 SCREENING FOR HIV (HUMAN IMMUNODEFICIENCY VIRUS): ICD-10-CM

## 2022-01-07 DIAGNOSIS — E11.65 TYPE 2 DIABETES MELLITUS WITH HYPERGLYCEMIA, WITHOUT LONG-TERM CURRENT USE OF INSULIN (HCC): Primary | ICD-10-CM

## 2022-01-07 DIAGNOSIS — J45.20 MILD INTERMITTENT ASTHMA WITHOUT COMPLICATION: ICD-10-CM

## 2022-01-07 DIAGNOSIS — E11.9 TYPE 2 DIABETES MELLITUS WITHOUT COMPLICATION, WITH LONG-TERM CURRENT USE OF INSULIN (HCC): ICD-10-CM

## 2022-01-07 DIAGNOSIS — Z11.59 NEED FOR HEPATITIS C SCREENING TEST: ICD-10-CM

## 2022-01-07 DIAGNOSIS — Z79.4 TYPE 2 DIABETES MELLITUS WITHOUT COMPLICATION, WITH LONG-TERM CURRENT USE OF INSULIN (HCC): ICD-10-CM

## 2022-01-07 PROBLEM — E78.00 PURE HYPERCHOLESTEROLEMIA: Status: ACTIVE | Noted: 2017-10-03

## 2022-01-07 LAB — SL AMB POCT HEMOGLOBIN AIC: 8.5 (ref ?–6.5)

## 2022-01-07 PROCEDURE — 99214 OFFICE O/P EST MOD 30 MIN: CPT | Performed by: FAMILY MEDICINE

## 2022-01-07 PROCEDURE — 3052F HG A1C>EQUAL 8.0%<EQUAL 9.0%: CPT | Performed by: FAMILY MEDICINE

## 2022-01-07 PROCEDURE — 83036 HEMOGLOBIN GLYCOSYLATED A1C: CPT | Performed by: FAMILY MEDICINE

## 2022-01-07 PROCEDURE — 99396 PREV VISIT EST AGE 40-64: CPT | Performed by: FAMILY MEDICINE

## 2022-01-07 PROCEDURE — 3008F BODY MASS INDEX DOCD: CPT | Performed by: FAMILY MEDICINE

## 2022-01-07 PROCEDURE — 3725F SCREEN DEPRESSION PERFORMED: CPT | Performed by: FAMILY MEDICINE

## 2022-01-07 RX ORDER — PEN NEEDLE, DIABETIC 31 GX5/16"
NEEDLE, DISPOSABLE MISCELLANEOUS WEEKLY
Qty: 100 EACH | Refills: 0 | Status: SHIPPED | OUTPATIENT
Start: 2022-01-07 | End: 2022-07-01

## 2022-01-07 RX ORDER — SEMAGLUTIDE 1.34 MG/ML
0.25 INJECTION, SOLUTION SUBCUTANEOUS WEEKLY
Qty: 1.5 ML | Refills: 5 | Status: SHIPPED | OUTPATIENT
Start: 2022-01-07 | End: 2022-04-01 | Stop reason: SDUPTHER

## 2022-01-07 NOTE — PROGRESS NOTES
Depression Screening and Follow-up Plan: Patient was screened for depression during today's encounter  They screened negative with a PHQ-2 score of 0

## 2022-01-07 NOTE — PATIENT INSTRUCTIONS
Mediterranean Diet   AMBULATORY CARE:   A Mediterranean diet  is a meal plan that includes foods that are commonly eaten in countries that border the [place]  This meal plan may provide several health benefits  These include losing or maintaining weight, and decreasing blood pressure, blood sugar, and cholesterol levels  It may also help protect against certain health conditions such as heart disease, cancer, type 2 diabetes, and Alzheimer disease  Work with a dietitian to develop a meal plan that is right for you  Foods to include in the [address] [facility] diet:   · Include fruits and vegetables in each meal   Eat a variety of fresh fruits and vegetables  · Choose whole grains every day  These foods include whole-grain breads, pastas, and cereals  It also includes brown rice, quinoa, and millet  · Use unsaturated fats instead of saturated fats  Cook with olive or canola oil  Limit saturated fats, such as butter, margarine, and shortening  Saturated fat is an unhealthy fat that can increase your cholesterol levels  · Choose plant foods, poultry, and fish as your main sources of protein  ? Eat plant-based foods that provide protein,  such as lentils, beans, chickpeas, nuts, and seeds  Choose mostly plant-based foods in place of meat on most days of the week  ? Eat protein foods high in omega-3 fats  Fish high in omega-3 fats include salmon, trout, and tuna  Include these types of fish 1 or 2 times each week  Limit fish high in mercury, such as shark, swordfish, tilefish, and adele mackerel  Omega-3 fats are also found in walnuts and flaxseed  ? Choose poultry (chicken or turkey)  without skin instead of red meat  Red meat is high in saturated fat  Limit eggs and high-fat meats, such as jacome, sausage, and hot dogs  · Choose low-fat dairy foods  such as nonfat or 1% milk, or low-fat almond, cashew, or soy milk   Other examples include low-fat cheese, yogurt, and cottage cheese  · Limit sweets  Limit your intake of high-sugar foods, such as soda, desserts, and candy  · Talk to your healthcare provider about alcohol  Studies have shown that moderate intake of wine may reduce the risk of heart disease  A moderate amount of wine is 1 serving for women and men 65 years and older each day  Two servings is recommended for men 24to 59years of age each day  A serving of wine is 5 ounces  Other things you need to know if you follow the Mediterranean diet:   · Include foods high in iron and vitamin C   Plant-based foods that are high in iron include spinach, beans, tofu, and artichoke  Eat a serving of vitamin C with any iron-rich food to help your body absorb more iron  Examples include oranges, strawberries, cantaloupe, broccoli, and yellow peppers  · Get regular physical activity  The Mediterranean diet will have the most benefit if you get regular physical activity  Get 30 minutes of physical activity at least 5 days a week  Choose physical activities that increase your heart rate  Examples include walking, hiking, swimming, and riding a bike  Ask your healthcare provider about the best exercise plan for you  © Copyright [employer] 2021 Information is for End User's use only and may not be sold, redistributed or otherwise used for commercial purposes  All illustrations and images included in CareNotes® are the copyrighted property of A D A M , Inc  or [facility] Knox   The above information is an  only  It is not intended as medical advice for individual conditions or treatments  Talk to your doctor, nurse or pharmacist before following any medical regimen to see if it is safe and effective for you  Walk 30 minutes a day, 5 days a week

## 2022-01-07 NOTE — PROGRESS NOTES
BMI Counseling: Body mass index is 34 58 kg/m²  The BMI is above normal  Nutrition recommendations include reducing portion sizes, decreasing overall calorie intake, 3-5 servings of fruits/vegetables daily, reducing fast food intake, consuming healthier snacks, decreasing soda and/or juice intake, moderation in carbohydrate intake, increasing intake of lean protein, reducing intake of saturated fat and trans fat and reducing intake of cholesterol  Exercise recommendations include moderate aerobic physical activity for 150 minutes/week, vigorous aerobic physical activity for 75 minutes/week, exercising 3-5 times per week, joining a gym and strength training exercises

## 2022-01-07 NOTE — PROGRESS NOTES
Assessment/Plan:    Type 2 diabetes mellitus with hyperglycemia (HCC)    Lab Results   Component Value Date    HGBA1C 8 5 (A) 01/07/2022   Continue current  Obstructive sleep apnea  Stable  Continue current  Hypertension  Stable  Continue current  Hyperlipidemia  Continue current  Diagnoses and all orders for this visit:    Type 2 diabetes mellitus with hyperglycemia, without long-term current use of insulin ([facility] )  -     Ambulatory Referral to Ophthalmology; Future  -     POCT hemoglobin A1c  -     Glucometer test strips    Need for hepatitis C screening test  -     Hepatitis C Antibody ([facility]); Future  -     Glucometer test strips    Screening for HIV (human immunodeficiency virus)  -     HIV 1/2 Antigen/Antibody (4th Generation) w Reflex [facility]; Future  -     Glucometer test strips    Type 2 diabetes mellitus without complication, with long-term current use of insulin ([facility])  -     Semaglutide,0 25 or 0 5MG/DOS, (Ozempic, 0 25 or 0 5 MG/DOSE,) 2 MG/1 5ML SOPN; Inject 0 25 mg under the skin once a week  -     Insulin Pen Needle (PEN NEEDLES 31GX5/16") 31G X 8 MM MISC; Use once a week  -     Glucometer test strips    Mild intermittent asthma without complication  -     Glucometer test strips          Subjective:      Patient ID: Reena Baron is a 46 y o  male  His A1c is not at goal in the office today at 8 5 percent  Fortunately, he has no side effects from his current regimen and no hypoglycemic events  He is not on insulin  There should be no contraindication for 's license  His blood pressure is at goal in the office today  He has no chest pain or shortness of breath  He has no headache or vision changes  He has no PND, orthopnea, or dyspnea on exertion  He has no syncope or near syncope  There should be no contraindication to 's license  His lipid panel is the best that it has been in the past several years    His HDL remains below goal but has come up  His LDL is below goal   His liver function testing is within normal limits and he has no myalgia or muscle weakness  He has obstructive sleep apnea  He has 100 percent compliance with use of his CPAP machine  He provides evidence of use today  The following portions of the patient's history were reviewed and updated as appropriate:   He  has a past medical history of Asthma (01/17/2013), De Quervain's tenosynovitis, right (3/23/2017), Diabetes mellitus ([facility] ), Hyperlipidemia, Hypertension, and Sleep apnea  He   Patient Active Problem List    Diagnosis Date Noted    Encounter for diabetic foot exam ([place] [address] ) 02/05/2019    Pure hypercholesterolemia 10/03/2017    De Quervain's tenosynovitis, right 03/23/2017    Mild intermittent asthma without complication 22/96/9872    Obstructive sleep apnea 08/28/2015    Type 2 diabetes mellitus with hyperglycemia ([place] [address] ) 05/30/2013    Hyperlipidemia 01/17/2013    Hypertension 01/17/2013     He  has a past surgical history that includes Rotator cuff repair (Right); Clavicle surgery; Inguinal hernia repair (Left); pr incis tendon sheath,radial styloid (Right, 3/23/2017); and Orchiectomy (Left)  His family history includes Asthma in his mother; Atrial fibrillation in his father; Diabetes in his mother  He  reports that he has never smoked  He has never used smokeless tobacco  He reports current alcohol use  He reports that he does not use drugs  Current Outpatient Medications   Medication Sig Dispense Refill    glipiZIDE-metFORMIN (METAGLIP) 2 5-500 MG per tablet TAKE 2 TABLETS TWICE A  tablet 3    glucose blood (FREESTYLE LITE) test strip Test daily   100 each 5    ibuprofen (MOTRIN) 200 mg tablet       Insulin Pen Needle (PEN NEEDLES 31GX5/16") 31G X 8 MM MISC Use once a week 100 each 0    Invokana 300 MG TABS TAKE 1 TABLET DAILY 90 tablet 3    metoprolol succinate (TOPROL-XL) 50 mg 24 hr tablet TAKE 1 TABLET DAILY 90 tablet 3  olmesartan-hydrochlorothiazide (BENICAR HCT) 40-25 MG per tablet TAKE 1 TABLET DAILY 90 tablet 3    Semaglutide,0 25 or 0 5MG/DOS, (Ozempic, 0 25 or 0 5 MG/DOSE,) 2 MG/1 5ML SOPN Inject 0 25 mg under the skin once a week 1 5 mL 5    simvastatin (ZOCOR) 20 mg tablet TAKE 1 TABLET DAILY IN THE EVENING 90 tablet 3     No current facility-administered medications for this visit  Current Outpatient Medications on File Prior to Visit   Medication Sig    glipiZIDE-metFORMIN (METAGLIP) 2 5-500 MG per tablet TAKE 2 TABLETS TWICE A DAY    glucose blood (FREESTYLE LITE) test strip Test daily   ibuprofen (MOTRIN) 200 mg tablet     Invokana 300 MG TABS TAKE 1 TABLET DAILY    metoprolol succinate (TOPROL-XL) 50 mg 24 hr tablet TAKE 1 TABLET DAILY    olmesartan-hydrochlorothiazide (BENICAR HCT) 40-25 MG per tablet TAKE 1 TABLET DAILY    simvastatin (ZOCOR) 20 mg tablet TAKE 1 TABLET DAILY IN THE EVENING    [DISCONTINUED] aspirin 81 MG tablet Take 81 mg by mouth daily    [DISCONTINUED] Januvia 100 MG tablet TAKE 1 TABLET DAILY     No current facility-administered medications on file prior to visit  He has No Known Allergies       Review of Systems   All other systems reviewed and are negative  Objective:      /78   Pulse 71   Temp 97 9 °F (36 6 °C)   Ht 5' 10" (1 778 m)   Wt 109 kg (241 lb)   SpO2 97%   BMI 34 58 kg/m²          Physical Exam  Vitals and nursing note reviewed  Constitutional:       Appearance: Normal appearance  HENT:      Head: Normocephalic and atraumatic  Cardiovascular:      Rate and Rhythm: Normal rate and regular rhythm  Pulses: Normal pulses  Heart sounds: Normal heart sounds  Pulmonary:      Effort: Pulmonary effort is normal       Breath sounds: Normal breath sounds  Abdominal:      General: Abdomen is flat  Bowel sounds are normal       Palpations: Abdomen is soft  Musculoskeletal:         General: Normal range of motion        Cervical back: Normal range of motion and neck supple  Skin:     General: Skin is warm and dry  Capillary Refill: Capillary refill takes less than 2 seconds  Neurological:      General: No focal deficit present  Mental Status: He is alert and oriented to person, place, and time  Mental status is at baseline  Psychiatric:         Mood and Affect: Mood normal          Behavior: Behavior normal          Thought Content:  Thought content normal          Judgment: Judgment normal

## 2022-01-07 NOTE — PROGRESS NOTES
Diabetic Foot Exam    Patient's shoes and socks removed  Right Foot/Ankle   Right Foot Inspection  Skin Exam: skin normal and skin intact  No dry skin, no warmth, no callus, no erythema, no maceration, no abnormal color, no pre-ulcer, no ulcer and no callus  Toe Exam: ROM and strength within normal limits  Sensory   Vibration: intact  Proprioception: intact  Monofilament testing: intact    Vascular  Capillary refills: < 3 seconds  The right DP pulse is 2+  The right PT pulse is 2+  Left Foot/Ankle  Left Foot Inspection  Skin Exam: skin normal and skin intact  No dry skin, no warmth, no erythema, no maceration, normal color, no pre-ulcer, no ulcer and no callus  Toe Exam: ROM and strength within normal limits  Sensory   Vibration: intact  Proprioception: intact  Monofilament testing: intact    Vascular  Capillary refills: < 3 seconds  The left DP pulse is 2+  The left PT pulse is 2+       Assign Risk Category  No deformity present  No loss of protective sensation  No weak pulses  Risk: 0

## 2022-01-07 NOTE — PROGRESS NOTES
Assessment/Plan     Healthy male exam      1  Declines influenza and COVID vaccine  2  Patient Counseling:  --Nutrition: Stressed importance of moderation in sodium/caffeine intake, saturated fat and cholesterol, caloric balance, sufficient intake of fresh fruits, vegetables, fiber, calcium, iron, and 1 mg of folate supplement per day (for females capable of pregnancy)  ----Exercise: Stressed the importance of regular exercise  --Substance Abuse: Discussed cessation/primary prevention of tobacco, alcohol, or other drug use; driving or other dangerous activities under the influence; availability of treatment for abuse  --Sexuality: Discussed sexually transmitted diseases, partner selection, use of condoms, avoidance of unintended pregnancy  and contraceptive alternatives  --Injury prevention: Discussed safety belts, safety helmets, smoke detector, smoking near bedding or upholstery  --Dental health: Discussed importance of regular tooth brushing, flossing, and dental visits  --Immunizations reviewed  --Discussed benefits of screening colonoscopy  --After hours service discussed with patient    3  Discussed the patient's BMI with him  The BMI is above average; BMI management plan is completed  4  Follow up as needed for acute illness  Can Gong is a 46 y o  male and is here for a comprehensive physical exam  The patient reports no problems  Do you take any herbs or supplements that were not prescribed by a doctor? no  Are you taking calcium supplements? no  Are you taking aspirin daily? no     History:  Patient receives prostate care outside our clinic  Date last prostate exam: n/a  Date last PSA: n/a    The following portions of the patient's history were reviewed and updated as appropriate:   He  has a past medical history of Asthma (01/17/2013), De Quervain's tenosynovitis, right (3/23/2017), Diabetes mellitus ([facility] ), Hyperlipidemia, Hypertension, and Sleep apnea    He Patient Active Problem List    Diagnosis Date Noted    Encounter for diabetic foot exam ([facility] ) 02/05/2019    Pure hypercholesterolemia 10/03/2017    De Quervain's tenosynovitis, right 03/23/2017    Mild intermittent asthma without complication 41/69/2460    Obstructive sleep apnea 08/28/2015    Type 2 diabetes mellitus with hyperglycemia ([facility] ) 05/30/2013    Hyperlipidemia 01/17/2013    Hypertension 01/17/2013     He  has a past surgical history that includes Rotator cuff repair (Right); Clavicle surgery; Inguinal hernia repair (Left); pr incis tendon sheath,radial styloid (Right, 3/23/2017); and Orchiectomy (Left)  His family history includes Asthma in his mother; Atrial fibrillation in his father; Diabetes in his mother  He  reports that he has never smoked  He has never used smokeless tobacco  He reports current alcohol use  He reports that he does not use drugs  Current Outpatient Medications   Medication Sig Dispense Refill    glipiZIDE-metFORMIN (METAGLIP) 2 5-500 MG per tablet TAKE 2 TABLETS TWICE A  tablet 3    glucose blood (FREESTYLE LITE) test strip Test daily  100 each 5    ibuprofen (MOTRIN) 200 mg tablet       Insulin Pen Needle (PEN NEEDLES 31GX5/16") 31G X 8 MM MISC Use once a week 100 each 0    Invokana 300 MG TABS TAKE 1 TABLET DAILY 90 tablet 3    metoprolol succinate (TOPROL-XL) 50 mg 24 hr tablet TAKE 1 TABLET DAILY 90 tablet 3    olmesartan-hydrochlorothiazide (BENICAR HCT) 40-25 MG per tablet TAKE 1 TABLET DAILY 90 tablet 3    Semaglutide,0 25 or 0 5MG/DOS, (Ozempic, 0 25 or 0 5 MG/DOSE,) 2 MG/1 5ML SOPN Inject 0 25 mg under the skin once a week 1 5 mL 5    simvastatin (ZOCOR) 20 mg tablet TAKE 1 TABLET DAILY IN THE EVENING 90 tablet 3     No current facility-administered medications for this visit       Current Outpatient Medications on File Prior to Visit   Medication Sig    glipiZIDE-metFORMIN (METAGLIP) 2 5-500 MG per tablet TAKE 2 TABLETS TWICE A DAY    glucose blood (FREESTYLE LITE) test strip Test daily   ibuprofen (MOTRIN) 200 mg tablet     Invokana 300 MG TABS TAKE 1 TABLET DAILY    metoprolol succinate (TOPROL-XL) 50 mg 24 hr tablet TAKE 1 TABLET DAILY    olmesartan-hydrochlorothiazide (BENICAR HCT) 40-25 MG per tablet TAKE 1 TABLET DAILY    simvastatin (ZOCOR) 20 mg tablet TAKE 1 TABLET DAILY IN THE EVENING    [DISCONTINUED] aspirin 81 MG tablet Take 81 mg by mouth daily    [DISCONTINUED] Januvia 100 MG tablet TAKE 1 TABLET DAILY     No current facility-administered medications on file prior to visit  He has No Known Allergies       Review of Systems  Do you have pain that bothers you in your daily life? no  Pertinent items are noted in HPI       Objective     /78   Pulse 71   Temp 97 9 °F (36 6 °C)   Ht 5' 10" (1 778 m)   Wt 109 kg (241 lb)   SpO2 97%   BMI 34 58 kg/m²     General Appearance:    Alert, cooperative, no distress, appears stated age   Head:    Normocephalic, without obvious abnormality, atraumatic   Eyes:    PERRL, conjunctiva/corneas clear, EOM's intact, fundi     benign, both eyes        Ears:    Normal TM's and external ear canals, both ears   Nose:   Nares normal, septum midline, mucosa normal, no drainage    or sinus tenderness   Throat:   Lips, mucosa, and tongue normal; teeth and gums normal   Neck:   Supple, symmetrical, trachea midline, no adenopathy;        thyroid:  No enlargement/tenderness/nodules; no carotid    bruit or JVD   Back:     Symmetric, no curvature, ROM normal, no CVA tenderness   Lungs:     Clear to auscultation bilaterally, respirations unlabored   Chest wall:    No tenderness or deformity   Heart:    Regular rate and rhythm, S1 and S2 normal, no murmur, rub   or gallop   Abdomen:     Soft, non-tender, bowel sounds active all four quadrants,     no masses, no organomegaly           Extremities:   Extremities normal, atraumatic, no cyanosis or edema   Pulses:   2+ and symmetric all extremities   Skin: Skin color, texture, turgor normal, no rashes or lesions   Lymph nodes:   Cervical, supraclavicular, and axillary nodes normal   Neurologic:   CNII-XII intact  Normal strength, sensation and reflexes       throughout

## 2022-01-27 ENCOUNTER — VBI (OUTPATIENT)
Dept: ADMINISTRATIVE | Facility: OTHER | Age: 52
End: 2022-01-27

## 2022-03-25 ENCOUNTER — RA CDI HCC (OUTPATIENT)
Dept: OTHER | Facility: HOSPITAL | Age: 52
End: 2022-03-25

## 2022-03-25 NOTE — PROGRESS NOTES
[facility]  coding opportunities       Chart reviewed, no opportunity found: CHART REVIEWED, NO OPPORTUNITY FOUND        Patients Insurance        Commercial Insurance: [employer]

## 2022-03-28 LAB
LEFT EYE DIABETIC RETINOPATHY: NORMAL
RIGHT EYE DIABETIC RETINOPATHY: NORMAL

## 2022-04-01 ENCOUNTER — OFFICE VISIT (OUTPATIENT)
Dept: FAMILY MEDICINE CLINIC | Facility: CLINIC | Age: 52
End: 2022-04-01
Payer: COMMERCIAL

## 2022-04-01 ENCOUNTER — TELEPHONE (OUTPATIENT)
Dept: ADMINISTRATIVE | Facility: OTHER | Age: 52
End: 2022-04-01

## 2022-04-01 VITALS
HEIGHT: 70 IN | WEIGHT: 238 LBS | OXYGEN SATURATION: 97 % | DIASTOLIC BLOOD PRESSURE: 78 MMHG | SYSTOLIC BLOOD PRESSURE: 132 MMHG | HEART RATE: 75 BPM | BODY MASS INDEX: 34.07 KG/M2 | TEMPERATURE: 98 F

## 2022-04-01 DIAGNOSIS — Z79.4 TYPE 2 DIABETES MELLITUS WITHOUT COMPLICATION, WITH LONG-TERM CURRENT USE OF INSULIN (HCC): ICD-10-CM

## 2022-04-01 DIAGNOSIS — Z23 NEED FOR SHINGLES VACCINE: Primary | ICD-10-CM

## 2022-04-01 DIAGNOSIS — E11.9 TYPE 2 DIABETES MELLITUS WITHOUT COMPLICATION, WITH LONG-TERM CURRENT USE OF INSULIN (HCC): ICD-10-CM

## 2022-04-01 DIAGNOSIS — R06.02 SOBOE (SHORTNESS OF BREATH ON EXERTION): ICD-10-CM

## 2022-04-01 DIAGNOSIS — Z11.4 SCREENING FOR HIV (HUMAN IMMUNODEFICIENCY VIRUS): ICD-10-CM

## 2022-04-01 DIAGNOSIS — E78.00 PURE HYPERCHOLESTEROLEMIA: ICD-10-CM

## 2022-04-01 DIAGNOSIS — I10 PRIMARY HYPERTENSION: ICD-10-CM

## 2022-04-01 DIAGNOSIS — G56.02 CARPAL TUNNEL SYNDROME OF LEFT WRIST: ICD-10-CM

## 2022-04-01 DIAGNOSIS — E78.5 HYPERLIPIDEMIA, UNSPECIFIED HYPERLIPIDEMIA TYPE: ICD-10-CM

## 2022-04-01 DIAGNOSIS — G47.33 OBSTRUCTIVE SLEEP APNEA: ICD-10-CM

## 2022-04-01 DIAGNOSIS — Z11.59 NEED FOR HEPATITIS C SCREENING TEST: ICD-10-CM

## 2022-04-01 DIAGNOSIS — E11.65 TYPE 2 DIABETES MELLITUS WITH HYPERGLYCEMIA, WITHOUT LONG-TERM CURRENT USE OF INSULIN (HCC): ICD-10-CM

## 2022-04-01 PROCEDURE — 3008F BODY MASS INDEX DOCD: CPT | Performed by: FAMILY MEDICINE

## 2022-04-01 PROCEDURE — 1036F TOBACCO NON-USER: CPT | Performed by: FAMILY MEDICINE

## 2022-04-01 PROCEDURE — 99214 OFFICE O/P EST MOD 30 MIN: CPT | Performed by: FAMILY MEDICINE

## 2022-04-01 RX ORDER — SEMAGLUTIDE 1.34 MG/ML
0.5 INJECTION, SOLUTION SUBCUTANEOUS WEEKLY
Qty: 1.5 ML | Refills: 5 | Status: SHIPPED | OUTPATIENT
Start: 2022-04-01 | End: 2022-07-01

## 2022-04-01 RX ORDER — ZOSTER VACCINE RECOMBINANT, ADJUVANTED 50 MCG/0.5
0.5 KIT INTRAMUSCULAR ONCE
Qty: 1 EACH | Refills: 1 | Status: SHIPPED | OUTPATIENT
Start: 2022-04-01 | End: 2022-04-01

## 2022-04-01 NOTE — TELEPHONE ENCOUNTER
Upon review of the In Basket request and the patient's chart, initial outreach has been made via fax, please see Contacts section for details       Thank you  Saray Erwin MA

## 2022-04-01 NOTE — LETTER
Diabetic Eye Exam Form    Date Requested: 22  Patient: Joni Lee  Patient : 1970   Referring Provider: Merline Avendaño MD      DIABETIC Eye Exam Date _______________________________    Type of Exam MUST be documented for Diabetic Eye Exams  Please CHECK ONE  Dilated Retinal Exam      Optomap-Iris Exam      Fundus Photography Completed       Left Eye - Please check Retinopathy AND Type or No Retinopathy      Exam did show retinopathy    Exam did not show retinopathy         Mild     Proliferative           Moderate    Severe            None         Right Eye - Please check Retinopathy AND Type or No Retinopathy     Exam did show retinopathy    Exam did not show retinopathy         Mild     Proliferative        Moderate    Severe        None       Comments __________________________________________________________    Practice Providing Exam ______________________________________________    Exam Performed By (print name) _______________________________________      Provider Signature ___________________________________________________    These reports are needed for  compliance  Please fax this completed form and a copy of the Diabetic Eye Exam report to our office located at [place] [address] as soon as possible via [phone] attention Patricia: Phone [phone]    We thank you for your assistance in treating our mutual patient     (sent to [address] [place] [address] Andi

## 2022-04-01 NOTE — TELEPHONE ENCOUNTER
----- Message from Liya Nichols sent at 4/1/2022  8:48 AM EDT -----  Regarding: Care Gap Request  04/01/22 8:48 AM    Hello, our patient attached above has had eye exam completed/performed  Please assist in updating the patient chart    The date of service is 3/2022, pt states exam done at [facility]    Thank you,  Liya Nichols  [facility]

## 2022-04-01 NOTE — PROGRESS NOTES
Assessment/Plan:    No problem-specific Assessment & Plan notes found for this encounter  Diagnoses and all orders for this visit:    Need for shingles vaccine  -     Zoster Vac Recomb Adjuvanted (Shingrix) 50 MCG/0 5ML SUSR; Inject 0 5 mL into a muscle once for 1 dose Repeat dose in 2 to 6 months    Need for hepatitis C screening test    Screening for HIV (human immunodeficiency virus)    Type 2 diabetes mellitus with hyperglycemia, without long-term current use of insulin (HCC)  -     Hemoglobin A1C ([facility]); Future  -     Ambulatory Referral to Ophthalmology; Future  -     Ambulatory referral to clinical pharmacy; Future    Primary hypertension    Hyperlipidemia, unspecified hyperlipidemia type    Pure hypercholesterolemia    Type 2 diabetes mellitus without complication, with long-term current use of insulin (HCC)  -     Semaglutide,0 25 or 0 5MG/DOS, (Ozempic, 0 25 or 0 5 MG/DOSE,) 2 MG/1 5ML SOPN; Inject 0 5 mg under the skin once a week    Obstructive sleep apnea    SOBOE (shortness of breath on exertion)  -     Echo complete w/ contrast if indicated; Future    Carpal tunnel syndrome of left wrist  -     EMG 2 Limb Upper Extremity; Future          Subjective:      Patient ID: Susan Krishna is a 46 y o  male  His A1c is not at goal   He is not on an optimum dose of Ozempic, however  He has no side effects and no hypoglycemia  His BP is at goal on his current regimen  He has no CP or SOB  He has no HA or vision changes  His lipids are at goal on his current regimen  We discussed the current guidelines and that he is not on a high-intensity statin  He is not interested in changing medications at this time            The following portions of the patient's history were reviewed and updated as appropriate:   He  has a past medical history of Asthma (01/17/2013), De Quervain's tenosynovitis, right (3/23/2017), Diabetes mellitus ([facility] ), Hyperlipidemia, Hypertension, and Sleep apnea  He   Patient Active Problem List    Diagnosis Date Noted    Encounter for diabetic foot exam ([facility] ) 02/05/2019    Pure hypercholesterolemia 10/03/2017    De Quervain's tenosynovitis, right 03/23/2017    Mild intermittent asthma without complication 99/02/5895    Obstructive sleep apnea 08/28/2015    Type 2 diabetes mellitus with hyperglycemia ([facility] ) 05/30/2013    Hyperlipidemia 01/17/2013    Hypertension 01/17/2013     He  has a past surgical history that includes Rotator cuff repair (Right); Clavicle surgery; Inguinal hernia repair (Left); pr incis tendon sheath,radial styloid (Right, 3/23/2017); and Orchiectomy (Left)  His family history includes Asthma in his mother; Atrial fibrillation in his father; Diabetes in his mother  He  reports that he has never smoked  He has never used smokeless tobacco  He reports current alcohol use  He reports that he does not use drugs  Current Outpatient Medications   Medication Sig Dispense Refill    glipiZIDE-metFORMIN (METAGLIP) 2 5-500 MG per tablet TAKE 2 TABLETS TWICE A  tablet 3    ibuprofen (MOTRIN) 200 mg tablet       Insulin Pen Needle (PEN NEEDLES 31GX5/16") 31G X 8 MM MISC Use once a week 100 each 0    Invokana 300 MG TABS TAKE 1 TABLET DAILY 90 tablet 3    metoprolol succinate (TOPROL-XL) 50 mg 24 hr tablet TAKE 1 TABLET DAILY 90 tablet 3    olmesartan-hydrochlorothiazide (BENICAR HCT) 40-25 MG per tablet TAKE 1 TABLET DAILY 90 tablet 3    Semaglutide,0 25 or 0 5MG/DOS, (Ozempic, 0 25 or 0 5 MG/DOSE,) 2 MG/1 5ML SOPN Inject 0 5 mg under the skin once a week 1 5 mL 5    simvastatin (ZOCOR) 20 mg tablet TAKE 1 TABLET DAILY IN THE EVENING 90 tablet 3    glucose blood (FREESTYLE LITE) test strip Test daily  100 each 5    Zoster Vac Recomb Adjuvanted (Shingrix) 50 MCG/0 5ML SUSR Inject 0 5 mL into a muscle once for 1 dose Repeat dose in 2 to 6 months 1 each 1     No current facility-administered medications for this visit       Current Outpatient Medications on File Prior to Visit   Medication Sig    glipiZIDE-metFORMIN (METAGLIP) 2 5-500 MG per tablet TAKE 2 TABLETS TWICE A DAY    ibuprofen (MOTRIN) 200 mg tablet     Insulin Pen Needle (PEN NEEDLES 31GX5/16") 31G X 8 MM MISC Use once a week    Invokana 300 MG TABS TAKE 1 TABLET DAILY    metoprolol succinate (TOPROL-XL) 50 mg 24 hr tablet TAKE 1 TABLET DAILY    olmesartan-hydrochlorothiazide (BENICAR HCT) 40-25 MG per tablet TAKE 1 TABLET DAILY    simvastatin (ZOCOR) 20 mg tablet TAKE 1 TABLET DAILY IN THE EVENING    [DISCONTINUED] Semaglutide,0 25 or 0 5MG/DOS, (Ozempic, 0 25 or 0 5 MG/DOSE,) 2 MG/1 5ML SOPN Inject 0 25 mg under the skin once a week    glucose blood (FREESTYLE LITE) test strip Test daily   [DISCONTINUED] aspirin 81 MG tablet Take 81 mg by mouth daily     No current facility-administered medications on file prior to visit  He has No Known Allergies       Review of Systems   All other systems reviewed and are negative  Objective:      /78   Pulse 75   Temp 98 °F (36 7 °C)   Ht 5' 10" (1 778 m)   Wt 108 kg (238 lb)   SpO2 97%   BMI 34 15 kg/m²          Physical Exam  Vitals and nursing note reviewed  Constitutional:       Appearance: Normal appearance  He is obese  Cardiovascular:      Rate and Rhythm: Normal rate and regular rhythm  Pulses: Normal pulses  Heart sounds: Normal heart sounds  Pulmonary:      Effort: Pulmonary effort is normal       Breath sounds: Normal breath sounds  Abdominal:      General: Abdomen is flat  Bowel sounds are normal       Palpations: Abdomen is soft  Musculoskeletal:         General: Normal range of motion  Cervical back: Normal range of motion and neck supple  Skin:     General: Skin is warm and dry  Capillary Refill: Capillary refill takes less than 2 seconds  Neurological:      General: No focal deficit present        Mental Status: He is alert and oriented to person, place, and time  Mental status is at baseline  Psychiatric:         Mood and Affect: Mood normal          Behavior: Behavior normal          Thought Content:  Thought content normal          Judgment: Judgment normal

## 2022-04-07 NOTE — TELEPHONE ENCOUNTER
Upon review of the In Basket request we were able to locate, review, and update the patient chart as requested for Diabetic Eye Exam     Any additional questions or concerns should be emailed to the Practice Liaisons via [email or web address] email, please do not reply via In Basket      Thank you  Sherif Gilliland MA

## 2022-04-13 DIAGNOSIS — E11.65 TYPE 2 DIABETES MELLITUS WITH HYPERGLYCEMIA, WITHOUT LONG-TERM CURRENT USE OF INSULIN (HCC): ICD-10-CM

## 2022-04-13 RX ORDER — CANAGLIFLOZIN 300 MG/1
TABLET, FILM COATED ORAL
Qty: 90 TABLET | Refills: 1 | Status: SHIPPED | OUTPATIENT
Start: 2022-04-13

## 2022-04-19 DIAGNOSIS — I10 ESSENTIAL HYPERTENSION: ICD-10-CM

## 2022-04-19 RX ORDER — SIMVASTATIN 20 MG
TABLET ORAL
Qty: 90 TABLET | Refills: 3 | Status: SHIPPED | OUTPATIENT
Start: 2022-04-19

## 2022-04-26 ENCOUNTER — TELEPHONE (OUTPATIENT)
Dept: FAMILY MEDICINE CLINIC | Facility: CLINIC | Age: 52
End: 2022-04-26

## 2022-04-28 DIAGNOSIS — I10 ESSENTIAL HYPERTENSION: ICD-10-CM

## 2022-04-28 RX ORDER — METOPROLOL SUCCINATE 50 MG/1
TABLET, EXTENDED RELEASE ORAL
Qty: 90 TABLET | Refills: 3 | Status: SHIPPED | OUTPATIENT
Start: 2022-04-28

## 2022-05-10 ENCOUNTER — TELEPHONE (OUTPATIENT)
Dept: FAMILY MEDICINE CLINIC | Facility: CLINIC | Age: 52
End: 2022-05-10

## 2022-05-10 DIAGNOSIS — E11.9 TYPE 2 DIABETES MELLITUS WITHOUT COMPLICATION, WITH LONG-TERM CURRENT USE OF INSULIN (HCC): ICD-10-CM

## 2022-05-10 DIAGNOSIS — Z79.4 TYPE 2 DIABETES MELLITUS WITHOUT COMPLICATION, WITH LONG-TERM CURRENT USE OF INSULIN (HCC): ICD-10-CM

## 2022-05-10 NOTE — TELEPHONE ENCOUNTER
Per pt, 0 5mg weekly of ozempic currently and is doing ok with that dose  However, pt is wanting to hold off [facility] at [facility]  He will want us to send it there in a few weeks

## 2022-07-01 ENCOUNTER — OFFICE VISIT (OUTPATIENT)
Dept: FAMILY MEDICINE CLINIC | Facility: CLINIC | Age: 52
End: 2022-07-01
Payer: COMMERCIAL

## 2022-07-01 VITALS
TEMPERATURE: 97.6 F | BODY MASS INDEX: 33.87 KG/M2 | DIASTOLIC BLOOD PRESSURE: 74 MMHG | OXYGEN SATURATION: 97 % | RESPIRATION RATE: 18 BRPM | HEIGHT: 70 IN | HEART RATE: 81 BPM | WEIGHT: 236.6 LBS | SYSTOLIC BLOOD PRESSURE: 118 MMHG

## 2022-07-01 DIAGNOSIS — G47.33 OBSTRUCTIVE SLEEP APNEA: ICD-10-CM

## 2022-07-01 DIAGNOSIS — Z11.59 NEED FOR HEPATITIS C SCREENING TEST: ICD-10-CM

## 2022-07-01 DIAGNOSIS — Z79.4 TYPE 2 DIABETES MELLITUS WITHOUT COMPLICATION, WITH LONG-TERM CURRENT USE OF INSULIN (HCC): Primary | ICD-10-CM

## 2022-07-01 DIAGNOSIS — R06.02 SOBOE (SHORTNESS OF BREATH ON EXERTION): ICD-10-CM

## 2022-07-01 DIAGNOSIS — E11.9 TYPE 2 DIABETES MELLITUS WITHOUT COMPLICATION, WITH LONG-TERM CURRENT USE OF INSULIN (HCC): Primary | ICD-10-CM

## 2022-07-01 DIAGNOSIS — Z11.4 SCREENING FOR HIV (HUMAN IMMUNODEFICIENCY VIRUS): ICD-10-CM

## 2022-07-01 DIAGNOSIS — Z23 ENCOUNTER FOR IMMUNIZATION: ICD-10-CM

## 2022-07-01 LAB — SL AMB POCT HEMOGLOBIN AIC: 8.4 (ref ?–6.5)

## 2022-07-01 PROCEDURE — 99214 OFFICE O/P EST MOD 30 MIN: CPT | Performed by: FAMILY MEDICINE

## 2022-07-01 PROCEDURE — 3052F HG A1C>EQUAL 8.0%<EQUAL 9.0%: CPT | Performed by: FAMILY MEDICINE

## 2022-07-01 PROCEDURE — 83036 HEMOGLOBIN GLYCOSYLATED A1C: CPT | Performed by: FAMILY MEDICINE

## 2022-07-01 RX ORDER — SEMAGLUTIDE 1.34 MG/ML
1 INJECTION, SOLUTION SUBCUTANEOUS WEEKLY
Qty: 1 ML | Refills: 5 | Status: SHIPPED | OUTPATIENT
Start: 2022-07-01

## 2022-07-01 NOTE — PROGRESS NOTES
Assessment/Plan:    No problem-specific Assessment & Plan notes found for this encounter  Diagnoses and all orders for this visit:    Type 2 diabetes mellitus without complication, with long-term current use of insulin (HCC)  -     POCT hemoglobin A1c  -     semaglutide, 1 mg/dose, (Ozempic, 1 MG/DOSE,) 4 MG/3ML SOPN injection pen; Inject 0 75 mL (1 mg total) under the skin once a week  -     HEMOGLOBIN A1C W/ EAG ESTIMATION; Future  -     Comprehensive metabolic panel; Future  -     Lipid panel; Future    Need for hepatitis C screening test    Screening for HIV (human immunodeficiency virus)    Encounter for immunization    Obstructive sleep apnea  -     Echo complete w/ contrast if indicated; Future    SOBOE (shortness of breath on exertion)  -     Echo complete w/ contrast if indicated; Future          Subjective:      Patient ID: Mayelin Reynoso is a 46 y o  male  His BP is at goal on his current regimen  He has no CP or SOB  He has no HA or vision changes/    His lipids are at goal on his current regimen  He has normal liver function testing and no myalgia or muscle weakness  His T2DM is not at goal   He has no side effects and no hypoglycemia  The following portions of the patient's history were reviewed and updated as appropriate:   He  has a past medical history of Asthma (01/17/2013), De Quervain's tenosynovitis, right (3/23/2017), Diabetes mellitus ([facility] ), Hyperlipidemia, Hypertension, and Sleep apnea    He   Patient Active Problem List    Diagnosis Date Noted    Encounter for diabetic foot exam ([facility] ) 02/05/2019    Pure hypercholesterolemia 10/03/2017    De Quervain's tenosynovitis, right 03/23/2017    Mild intermittent asthma without complication 28/10/7809    Obstructive sleep apnea 08/28/2015    Type 2 diabetes mellitus with hyperglycemia ([facility] ) 05/30/2013    Hyperlipidemia 01/17/2013    Hypertension 01/17/2013     He  has a past surgical history that includes Rotator cuff repair (Right); Clavicle surgery; Inguinal hernia repair (Left); pr incis tendon sheath,radial styloid (Right, 3/23/2017); and Orchiectomy (Left)  His family history includes Asthma in his mother; Atrial fibrillation in his father; Diabetes in his mother  He  reports that he has never smoked  He has never used smokeless tobacco  He reports current alcohol use  He reports that he does not use drugs  Current Outpatient Medications   Medication Sig Dispense Refill    glipiZIDE-metFORMIN (METAGLIP) 2 5-500 MG per tablet TAKE 2 TABLETS TWICE A  tablet 3    glucose blood (FREESTYLE LITE) test strip Test daily  100 each 5    ibuprofen (MOTRIN) 200 mg tablet       Invokana 300 MG TABS TAKE 1 TABLET DAILY 90 tablet 1    metoprolol succinate (TOPROL-XL) 50 mg 24 hr tablet TAKE 1 TABLET DAILY 90 tablet 3    olmesartan-hydrochlorothiazide (BENICAR HCT) 40-25 MG per tablet TAKE 1 TABLET DAILY 90 tablet 3    semaglutide, 1 mg/dose, (Ozempic, 1 MG/DOSE,) 4 MG/3ML SOPN injection pen Inject 0 75 mL (1 mg total) under the skin once a week 1 mL 5    simvastatin (ZOCOR) 20 mg tablet TAKE 1 TABLET DAILY IN THE EVENING 90 tablet 3     No current facility-administered medications for this visit  Current Outpatient Medications on File Prior to Visit   Medication Sig    glipiZIDE-metFORMIN (METAGLIP) 2 5-500 MG per tablet TAKE 2 TABLETS TWICE A DAY    glucose blood (FREESTYLE LITE) test strip Test daily   ibuprofen (MOTRIN) 200 mg tablet     Invokana 300 MG TABS TAKE 1 TABLET DAILY    metoprolol succinate (TOPROL-XL) 50 mg 24 hr tablet TAKE 1 TABLET DAILY    olmesartan-hydrochlorothiazide (BENICAR HCT) 40-25 MG per tablet TAKE 1 TABLET DAILY    simvastatin (ZOCOR) 20 mg tablet TAKE 1 TABLET DAILY IN THE EVENING    [DISCONTINUED] aspirin 81 MG tablet Take 81 mg by mouth daily     No current facility-administered medications on file prior to visit  He has No Known Allergies       Review of Systems   All other systems reviewed and are negative  Objective:      /74 (BP Location: Left arm, Patient Position: Sitting, Cuff Size: Large)   Pulse 81   Temp 97 6 °F (36 4 °C) (Temporal)   Resp 18   Ht 5' 10" (1 778 m)   Wt 107 kg (236 lb 9 6 oz)   SpO2 97%   BMI 33 95 kg/m²          Physical Exam  Vitals and nursing note reviewed  Constitutional:       Appearance: Normal appearance  He is obese  Cardiovascular:      Rate and Rhythm: Normal rate and regular rhythm  Pulses: Normal pulses  Heart sounds: Normal heart sounds  Pulmonary:      Effort: Pulmonary effort is normal       Breath sounds: Normal breath sounds  Abdominal:      General: Abdomen is flat  Bowel sounds are normal       Palpations: Abdomen is soft  Musculoskeletal:         General: Normal range of motion  Cervical back: Normal range of motion and neck supple  Skin:     General: Skin is warm and dry  Capillary Refill: Capillary refill takes less than 2 seconds  Neurological:      General: No focal deficit present  Mental Status: He is alert and oriented to person, place, and time  Mental status is at baseline  Psychiatric:         Mood and Affect: Mood normal          Behavior: Behavior normal          Thought Content:  Thought content normal          Judgment: Judgment normal

## 2022-07-18 ENCOUNTER — TELEPHONE (OUTPATIENT)
Dept: FAMILY MEDICINE CLINIC | Facility: CLINIC | Age: 52
End: 2022-07-18

## 2022-08-04 ENCOUNTER — TELEPHONE (OUTPATIENT)
Dept: FAMILY MEDICINE CLINIC | Facility: CLINIC | Age: 52
End: 2022-08-04

## 2022-08-04 NOTE — TELEPHONE ENCOUNTER
Attempted to call patient to review BG readings and assess higher dose of Ozempic  No answer on line  Left message for patient with direct call back number

## 2022-09-26 ENCOUNTER — HOSPITAL ENCOUNTER (OUTPATIENT)
Dept: NON INVASIVE DIAGNOSTICS | Facility: HOSPITAL | Age: 52
Discharge: HOME | End: 2022-09-26
Payer: COMMERCIAL

## 2022-09-26 VITALS
WEIGHT: 236 LBS | DIASTOLIC BLOOD PRESSURE: 70 MMHG | BODY MASS INDEX: 33.79 KG/M2 | HEIGHT: 70 IN | HEART RATE: 72 BPM | SYSTOLIC BLOOD PRESSURE: 122 MMHG

## 2022-09-26 DIAGNOSIS — R06.02 SOBOE (SHORTNESS OF BREATH ON EXERTION): ICD-10-CM

## 2022-09-26 DIAGNOSIS — G47.33 OBSTRUCTIVE SLEEP APNEA: ICD-10-CM

## 2022-09-26 PROCEDURE — 93306 TTE W/DOPPLER COMPLETE: CPT | Performed by: INTERNAL MEDICINE

## 2022-09-26 PROCEDURE — 93306 TTE W/DOPPLER COMPLETE: CPT

## 2022-09-27 LAB
AORTIC ROOT: 3.5 CM
AORTIC VALVE MEAN VELOCITY: 17.8 M/S
APICAL FOUR CHAMBER EJECTION FRACTION: 75 %
ASCENDING AORTA: 4.5 CM
AV AREA BY CONTINUOUS VTI: 1.6 CM2
AV AREA PEAK VELOCITY: 1.7 CM2
AV LVOT MEAN GRADIENT: 2 MMHG
AV LVOT PEAK GRADIENT: 3 MMHG
AV MEAN GRADIENT: 14 MMHG
AV PEAK GRADIENT: 23 MMHG
AV VALVE AREA: 1.6 CM2
AV VELOCITY RATIO: 0.34
DOP CALC AO PEAK VEL: 2.41 M/S
DOP CALC AO VTI: 48.23 CM
DOP CALC LVOT AREA: 4.52 CM2
DOP CALC LVOT DIAMETER: 2.4 CM
DOP CALC LVOT PEAK VEL VTI: 17.03 CM
DOP CALC LVOT PEAK VEL: 0.81 M/S
DOP CALC LVOT STROKE INDEX: 33.5 ML/M2
DOP CALC LVOT STROKE VOLUME: 75
E WAVE DECELERATION TIME: 252 MS
E/A RATIO: 0.88
FRACTIONAL SHORTENING: 33 (ref 28–44)
INTERVENTRICULAR SEPTUM IN DIASTOLE (PARASTERNAL SHORT AXIS VIEW): 0.9 CM
INTERVENTRICULAR SEPTUM: 0.9 CM (ref 0.6–1.1)
LAAS-AP2: 10.5 CM2
LAAS-AP4: 9.8 CM2
LEFT ATRIUM SIZE: 2.6 CM
LEFT ATRIUM VOLUME INDEX (MOD BIPLANE): 11.6
LEFT INTERNAL DIMENSION IN SYSTOLE: 2.4 CM (ref 2.1–4)
LEFT VENTRICULAR INTERNAL DIMENSION IN DIASTOLE: 3.6 CM (ref 3.5–6)
LEFT VENTRICULAR POSTERIOR WALL IN END DIASTOLE: 0.9 CM
LEFT VENTRICULAR STROKE VOLUME: 36 ML
LVSV (TEICH): 36 ML
MV E'TISSUE VEL-LAT: 9 CM/S
MV E'TISSUE VEL-SEP: 9 CM/S
MV PEAK A VEL: 0.69 M/S
MV PEAK E VEL: 61 CM/S
MV STENOSIS PRESSURE HALF TIME: 73 MS
MV VALVE AREA P 1/2 METHOD: 3
RIGHT ATRIAL 2D VOLUME: 12 ML
RIGHT ATRIUM AREA SYSTOLE A4C: 7.8 CM2
RIGHT VENTRICLE ID DIMENSION: 2.8 CM
SL CV LEFT ATRIUM LENGTH A2C: 4.8 CM
SL CV LV EF: 70
SL CV PED ECHO LEFT VENTRICLE DIASTOLIC VOLUME (MOD BIPLANE) 2D: 56 ML
SL CV PED ECHO LEFT VENTRICLE SYSTOLIC VOLUME (MOD BIPLANE) 2D: 20 ML
TR MAX PG: 0 MMHG
TR PEAK VELOCITY: 0.2 M/S
TRICUSPID VALVE PEAK REGURGITATION VELOCITY: 0.16 M/S

## 2022-10-10 DIAGNOSIS — E11.65 TYPE 2 DIABETES MELLITUS WITH HYPERGLYCEMIA, WITHOUT LONG-TERM CURRENT USE OF INSULIN (HCC): ICD-10-CM

## 2022-10-10 RX ORDER — CANAGLIFLOZIN 300 MG/1
TABLET, FILM COATED ORAL
Qty: 90 TABLET | Refills: 3 | Status: SHIPPED | OUTPATIENT
Start: 2022-10-10

## 2022-11-01 DIAGNOSIS — I10 ESSENTIAL HYPERTENSION: ICD-10-CM

## 2022-11-01 RX ORDER — OLMESARTAN MEDOXOMIL AND HYDROCHLOROTHIAZIDE 40/25 40; 25 MG/1; MG/1
TABLET ORAL
Qty: 90 TABLET | Refills: 3 | Status: SHIPPED | OUTPATIENT
Start: 2022-11-01

## 2022-11-10 ENCOUNTER — APPOINTMENT (OUTPATIENT)
Dept: LAB | Facility: CLINIC | Age: 52
End: 2022-11-10

## 2022-11-10 DIAGNOSIS — E11.65 TYPE 2 DIABETES MELLITUS WITH HYPERGLYCEMIA, WITHOUT LONG-TERM CURRENT USE OF INSULIN (HCC): ICD-10-CM

## 2022-11-10 DIAGNOSIS — E11.9 TYPE 2 DIABETES MELLITUS WITHOUT COMPLICATION, WITH LONG-TERM CURRENT USE OF INSULIN (HCC): ICD-10-CM

## 2022-11-10 DIAGNOSIS — Z79.4 TYPE 2 DIABETES MELLITUS WITHOUT COMPLICATION, WITH LONG-TERM CURRENT USE OF INSULIN (HCC): ICD-10-CM

## 2022-11-10 LAB
ALBUMIN SERPL BCP-MCNC: 4 G/DL (ref 3.5–5)
ALP SERPL-CCNC: 77 U/L (ref 46–116)
ALT SERPL W P-5'-P-CCNC: 61 U/L (ref 12–78)
ANION GAP SERPL CALCULATED.3IONS-SCNC: 6 MMOL/L (ref 4–13)
AST SERPL W P-5'-P-CCNC: 27 U/L (ref 5–45)
BILIRUB SERPL-MCNC: 0.47 MG/DL (ref 0.2–1)
BUN SERPL-MCNC: 18 MG/DL (ref 5–25)
CALCIUM SERPL-MCNC: 9.5 MG/DL (ref 8.3–10.1)
CHLORIDE SERPL-SCNC: 102 MMOL/L (ref 96–108)
CHOLEST SERPL-MCNC: 136 MG/DL
CO2 SERPL-SCNC: 27 MMOL/L (ref 21–32)
CREAT SERPL-MCNC: 1.12 MG/DL (ref 0.6–1.3)
EST. AVERAGE GLUCOSE BLD GHB EST-MCNC: 200 MG/DL
GFR SERPL CREATININE-BSD FRML MDRD: 74 ML/MIN/1.73SQ M
GLUCOSE P FAST SERPL-MCNC: 185 MG/DL (ref 65–99)
HBA1C MFR BLD: 8.6 %
HDLC SERPL-MCNC: 27 MG/DL
NONHDLC SERPL-MCNC: 109 MG/DL
POTASSIUM SERPL-SCNC: 5 MMOL/L (ref 3.5–5.3)
PROT SERPL-MCNC: 7.8 G/DL (ref 6.4–8.4)
SODIUM SERPL-SCNC: 135 MMOL/L (ref 135–147)
TRIGL SERPL-MCNC: 501 MG/DL

## 2022-11-11 ENCOUNTER — OFFICE VISIT (OUTPATIENT)
Dept: FAMILY MEDICINE CLINIC | Facility: CLINIC | Age: 52
End: 2022-11-11

## 2022-11-11 VITALS
SYSTOLIC BLOOD PRESSURE: 119 MMHG | HEART RATE: 79 BPM | BODY MASS INDEX: 33.73 KG/M2 | TEMPERATURE: 97.4 F | DIASTOLIC BLOOD PRESSURE: 73 MMHG | RESPIRATION RATE: 18 BRPM | HEIGHT: 70 IN | WEIGHT: 235.6 LBS | OXYGEN SATURATION: 98 %

## 2022-11-11 DIAGNOSIS — E78.00 PURE HYPERCHOLESTEROLEMIA: ICD-10-CM

## 2022-11-11 DIAGNOSIS — Z23 ENCOUNTER FOR IMMUNIZATION: ICD-10-CM

## 2022-11-11 DIAGNOSIS — Z11.4 SCREENING FOR HIV (HUMAN IMMUNODEFICIENCY VIRUS): ICD-10-CM

## 2022-11-11 DIAGNOSIS — Z79.4 TYPE 2 DIABETES MELLITUS WITHOUT COMPLICATION, WITH LONG-TERM CURRENT USE OF INSULIN (HCC): ICD-10-CM

## 2022-11-11 DIAGNOSIS — E11.65 TYPE 2 DIABETES MELLITUS WITH HYPERGLYCEMIA, WITHOUT LONG-TERM CURRENT USE OF INSULIN (HCC): ICD-10-CM

## 2022-11-11 DIAGNOSIS — E11.9 TYPE 2 DIABETES MELLITUS WITHOUT COMPLICATION, WITH LONG-TERM CURRENT USE OF INSULIN (HCC): ICD-10-CM

## 2022-11-11 DIAGNOSIS — Z11.59 NEED FOR HEPATITIS C SCREENING TEST: ICD-10-CM

## 2022-11-11 DIAGNOSIS — I10 PRIMARY HYPERTENSION: ICD-10-CM

## 2022-11-11 DIAGNOSIS — R42 VERTIGO: Primary | ICD-10-CM

## 2022-11-11 RX ORDER — MECLIZINE HYDROCHLORIDE 25 MG/1
25 TABLET ORAL 3 TIMES DAILY PRN
Qty: 30 TABLET | Refills: 5 | Status: SHIPPED | OUTPATIENT
Start: 2022-11-11

## 2022-11-11 RX ORDER — SEMAGLUTIDE 2.68 MG/ML
2 INJECTION, SOLUTION SUBCUTANEOUS WEEKLY
Qty: 3 ML | Refills: 5 | Status: SHIPPED | OUTPATIENT
Start: 2022-11-11

## 2022-11-11 RX ORDER — MECLIZINE HYDROCHLORIDE 25 MG/1
25 TABLET ORAL 3 TIMES DAILY PRN
COMMUNITY
Start: 2022-11-07

## 2022-11-11 NOTE — PROGRESS NOTES
Name: Brenda Bay      : 1970      MRN: [id number]  Encounter Provider: Jamia Garner MD  Encounter Date: 2022   Encounter department: [address] [place] [address]  Vertigo  Assessment & Plan:  Symptoms resolved  He is taking Antivert at night  This should pose no sedation during the day when he is driving  I have provided him with a prescription to have on a p r n  Basis  This is not a long-term medication  Orders:  -     meclizine (ANTIVERT) 25 mg tablet; Take 1 tablet (25 mg total) by mouth 3 (three) times a day as needed for dizziness    2  Need for hepatitis C screening test    3  Screening for HIV (human immunodeficiency virus)    4  Encounter for immunization    5  Type 2 diabetes mellitus without complication, with long-term current use of insulin ([facility])  -     Semaglutide, 2 MG/DOSE, (Ozempic, 2 MG/DOSE,) 8 MG/3ML SOPN; Inject 2 mg under the skin once a week    6  Type 2 diabetes mellitus with hyperglycemia, without long-term current use of insulin ([facility])    7  Primary hypertension    8  Pure hypercholesterolemia         Subjective      His hypertension  His blood pressure is well controlled in the office today  He has no headache or vision changes  He has no chest pain or shortness of breath  He has no PND, orthopnea, or dyspnea on exertion  He is tolerating this blood pressure medication regimen well  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  He has obstructive sleep apnea  He uses his CPAP machine nightly  His symptoms are well controlled  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  He has type 2 diabetes mellitus  His hemoglobin A1c is 8 4 percent  He has no side effects from his current regimen  He has not experienced any hypoglycemic events  He is not on insulin  All of his medications are low risk for hypoglycemia  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        Review of Systems   All other systems reviewed and are negative  Current Outpatient Medications on File Prior to Visit   Medication Sig   • glipiZIDE-metFORMIN (METAGLIP) 2 5-500 MG per tablet TAKE 2 TABLETS TWICE A DAY   • glucose blood (FREESTYLE LITE) test strip Test daily  • ibuprofen (MOTRIN) 200 mg tablet    • Invokana 300 MG TABS TAKE 1 TABLET DAILY   • meclizine (ANTIVERT) 25 mg tablet Take 25 mg by mouth 3 (three) times a day as needed   • metoprolol succinate (TOPROL-XL) 50 mg 24 hr tablet TAKE 1 TABLET DAILY   • olmesartan-hydrochlorothiazide (BENICAR HCT) 40-25 MG per tablet TAKE 1 TABLET DAILY   • simvastatin (ZOCOR) 20 mg tablet TAKE 1 TABLET DAILY IN THE EVENING   • [DISCONTINUED] semaglutide, 1 mg/dose, (Ozempic, 1 MG/DOSE,) 4 MG/3ML SOPN injection pen Inject 0 75 mL (1 mg total) under the skin once a week   • [DISCONTINUED] aspirin 81 MG tablet Take 81 mg by mouth daily       Objective     /73 (BP Location: Left arm, Patient Position: Sitting, Cuff Size: Large)   Pulse 79   Temp (!) 97 4 °F (36 3 °C) (Temporal)   Resp 18   Ht 5' 10" (1 778 m)   Wt 107 kg (235 lb 9 6 oz)   SpO2 98%   BMI 33 81 kg/m²     Physical Exam  Vitals and nursing note reviewed  Constitutional:       Appearance: Normal appearance  Cardiovascular:      Rate and Rhythm: Normal rate and regular rhythm  Pulses: Normal pulses  Heart sounds: Normal heart sounds  Pulmonary:      Effort: Pulmonary effort is normal       Breath sounds: Normal breath sounds  Abdominal:      General: Abdomen is flat  Bowel sounds are normal       Palpations: Abdomen is soft  Musculoskeletal:         General: Normal range of motion  Cervical back: Normal range of motion and neck supple  Skin:     General: Skin is warm and dry  Capillary Refill: Capillary refill takes less than 2 seconds  Neurological:      General: No focal deficit present  Mental Status: He is alert and oriented to person, place, and time  Mental status is at baseline  Psychiatric:         Mood and Affect: Mood normal          Behavior: Behavior normal          Thought Content:  Thought content normal          Judgment: Judgment normal        Joseline Weber MD

## 2022-11-11 NOTE — ASSESSMENT & PLAN NOTE
Symptoms resolved  He is taking Antivert at night  This should pose no sedation during the day when he is driving  I have provided him with a prescription to have on a p r n  Basis  This is not a long-term medication

## 2022-11-25 DIAGNOSIS — E11.9 TYPE 2 DIABETES MELLITUS WITHOUT COMPLICATION, WITH LONG-TERM CURRENT USE OF INSULIN (HCC): ICD-10-CM

## 2022-11-25 DIAGNOSIS — Z79.4 TYPE 2 DIABETES MELLITUS WITHOUT COMPLICATION, WITH LONG-TERM CURRENT USE OF INSULIN (HCC): ICD-10-CM

## 2022-11-25 DIAGNOSIS — E11.9 TYPE 2 DIABETES MELLITUS WITHOUT COMPLICATION, WITHOUT LONG-TERM CURRENT USE OF INSULIN (HCC): ICD-10-CM

## 2022-11-25 RX ORDER — SEMAGLUTIDE 2.68 MG/ML
2 INJECTION, SOLUTION SUBCUTANEOUS WEEKLY
Qty: 3 ML | Refills: 0 | Status: SHIPPED | OUTPATIENT
Start: 2022-11-25

## 2022-11-25 RX ORDER — GLIPIZIDE AND METFORMIN HCL 2.5; 5 MG/1; MG/1
TABLET, FILM COATED ORAL
Qty: 360 TABLET | Refills: 3 | Status: SHIPPED | OUTPATIENT
Start: 2022-11-25

## 2022-12-13 DIAGNOSIS — E11.9 TYPE 2 DIABETES MELLITUS WITHOUT COMPLICATION, WITH LONG-TERM CURRENT USE OF INSULIN (HCC): ICD-10-CM

## 2022-12-13 DIAGNOSIS — Z79.4 TYPE 2 DIABETES MELLITUS WITHOUT COMPLICATION, WITH LONG-TERM CURRENT USE OF INSULIN (HCC): ICD-10-CM

## 2022-12-13 RX ORDER — SEMAGLUTIDE 2.68 MG/ML
2 INJECTION, SOLUTION SUBCUTANEOUS WEEKLY
Qty: 3 ML | Refills: 0 | Status: SHIPPED | OUTPATIENT
Start: 2022-12-13

## 2022-12-14 DIAGNOSIS — R42 VERTIGO: ICD-10-CM

## 2022-12-15 RX ORDER — MECLIZINE HYDROCHLORIDE 25 MG/1
25 TABLET ORAL 3 TIMES DAILY PRN
Qty: 30 TABLET | Refills: 0 | Status: SHIPPED | OUTPATIENT
Start: 2022-12-15

## 2023-01-10 DIAGNOSIS — Z79.4 TYPE 2 DIABETES MELLITUS WITHOUT COMPLICATION, WITH LONG-TERM CURRENT USE OF INSULIN (HCC): ICD-10-CM

## 2023-01-10 DIAGNOSIS — E11.9 TYPE 2 DIABETES MELLITUS WITHOUT COMPLICATION, WITH LONG-TERM CURRENT USE OF INSULIN (HCC): ICD-10-CM

## 2023-01-24 DIAGNOSIS — E11.9 TYPE 2 DIABETES MELLITUS WITHOUT COMPLICATION, WITH LONG-TERM CURRENT USE OF INSULIN (HCC): ICD-10-CM

## 2023-01-24 DIAGNOSIS — Z79.4 TYPE 2 DIABETES MELLITUS WITHOUT COMPLICATION, WITH LONG-TERM CURRENT USE OF INSULIN (HCC): ICD-10-CM

## 2023-02-03 ENCOUNTER — RA CDI HCC (OUTPATIENT)
Dept: OTHER | Facility: HOSPITAL | Age: 53
End: 2023-02-03

## 2023-02-03 NOTE — PROGRESS NOTES
[facility]  coding opportunities       Chart reviewed, no opportunity found: CHART REVIEWED, NO OPPORTUNITY FOUND        Patients Insurance        Commercial Insurance: [employer]

## 2023-02-10 ENCOUNTER — OFFICE VISIT (OUTPATIENT)
Dept: FAMILY MEDICINE CLINIC | Facility: CLINIC | Age: 53
End: 2023-02-10

## 2023-02-10 VITALS
TEMPERATURE: 97.9 F | OXYGEN SATURATION: 98 % | DIASTOLIC BLOOD PRESSURE: 95 MMHG | HEART RATE: 72 BPM | SYSTOLIC BLOOD PRESSURE: 135 MMHG | RESPIRATION RATE: 18 BRPM | BODY MASS INDEX: 33.92 KG/M2 | WEIGHT: 236.4 LBS

## 2023-02-10 DIAGNOSIS — E11.65 TYPE 2 DIABETES MELLITUS WITH HYPERGLYCEMIA, WITHOUT LONG-TERM CURRENT USE OF INSULIN (HCC): ICD-10-CM

## 2023-02-10 DIAGNOSIS — I10 PRIMARY HYPERTENSION: ICD-10-CM

## 2023-02-10 DIAGNOSIS — E11.9 TYPE 2 DIABETES MELLITUS WITHOUT COMPLICATION, WITH LONG-TERM CURRENT USE OF INSULIN (HCC): Primary | ICD-10-CM

## 2023-02-10 DIAGNOSIS — J45.20 MILD INTERMITTENT ASTHMA WITHOUT COMPLICATION: ICD-10-CM

## 2023-02-10 DIAGNOSIS — Z11.59 NEED FOR HEPATITIS C SCREENING TEST: ICD-10-CM

## 2023-02-10 DIAGNOSIS — G47.33 OBSTRUCTIVE SLEEP APNEA: ICD-10-CM

## 2023-02-10 DIAGNOSIS — Z11.4 SCREENING FOR HIV (HUMAN IMMUNODEFICIENCY VIRUS): ICD-10-CM

## 2023-02-10 DIAGNOSIS — E78.5 HYPERLIPIDEMIA, UNSPECIFIED HYPERLIPIDEMIA TYPE: ICD-10-CM

## 2023-02-10 DIAGNOSIS — Z79.4 TYPE 2 DIABETES MELLITUS WITHOUT COMPLICATION, WITH LONG-TERM CURRENT USE OF INSULIN (HCC): Primary | ICD-10-CM

## 2023-02-10 DIAGNOSIS — Q23.1 BICUSPID AORTIC VALVE: ICD-10-CM

## 2023-02-10 PROBLEM — Q23.81 BICUSPID AORTIC VALVE: Status: ACTIVE | Noted: 2023-02-10

## 2023-02-10 RX ORDER — CEPHALEXIN 500 MG/1
500 CAPSULE ORAL EVERY 8 HOURS SCHEDULED
Qty: 21 CAPSULE | Refills: 0 | Status: SHIPPED | OUTPATIENT
Start: 2023-02-10 | End: 2023-02-17

## 2023-02-10 RX ORDER — LANCETS 33 GAUGE
EACH MISCELLANEOUS
Qty: 100 EACH | Refills: 3 | Status: SHIPPED | OUTPATIENT
Start: 2023-02-10

## 2023-02-10 RX ORDER — BLOOD-GLUCOSE METER
KIT MISCELLANEOUS
Qty: 1 KIT | Refills: 0 | Status: SHIPPED | OUTPATIENT
Start: 2023-02-10

## 2023-02-10 RX ORDER — BLOOD SUGAR DIAGNOSTIC
STRIP MISCELLANEOUS
Qty: 100 EACH | Refills: 3 | Status: SHIPPED | OUTPATIENT
Start: 2023-02-10

## 2023-02-10 NOTE — PROGRESS NOTES
Name: Lori Reddy      : 1970      MRN: [id number]  Encounter Provider: Marisel Frederick MD  Encounter Date: 2/10/2023   Encounter department: [address] [place] [address]     1  Type 2 diabetes mellitus without complication, with long-term current use of insulin ([facility] )  -     Ambulatory referral to clinical pharmacy; Future  -     semaglutide, 2 mg/dose, (Ozempic, 2 MG/DOSE,) 8 mg/ mL injection pen; Inject 0 75 mL (2 mg total) under the skin once a week    2  Need for hepatitis C screening test  -     Hepatitis C Antibody ([facility]); Future    3  Screening for HIV (human immunodeficiency virus)    4  Type 2 diabetes mellitus with hyperglycemia, without long-term current use of insulin ([facility])  Assessment & Plan:    Lab Results   Component Value Date    HGBA1C 8 6 (H) 11/10/2022   A1c not at goal but stable  Orders:  -     Urine Microalbumin/creatinine ratio; Future    5  Obstructive sleep apnea  Assessment & Plan:  Stable  Continue current  6  Mild intermittent asthma without complication    7  Primary hypertension  Assessment & Plan:  Stable  Continue current  8  Hyperlipidemia, unspecified hyperlipidemia type    9  Bicuspid aortic valve         Subjective      His A1c is not at goal but it is stable  He is having difficulty getting Ozempic (shortages)  He has no side effects and no hypoglycemia  His BP is under better control at home  He recently passed his 's license  His lipids are at goal on his current regimen  He has normal liver function testing  He has no muscle weakness or myalgias  Review of Systems    Current Outpatient Medications on File Prior to Visit   Medication Sig   • glipiZIDE-metFORMIN (METAGLIP) 2 5-500 MG per tablet TAKE 2 TABLETS TWICE A DAY   • glucose blood (FREESTYLE LITE) test strip Test daily     • Invokana 300 MG TABS TAKE 1 TABLET DAILY   • meclizine (ANTIVERT) 25 mg tablet Take 1 tablet (25 mg total) by mouth 3 (three) times a day as needed for dizziness   • metoprolol succinate (TOPROL-XL) 50 mg 24 hr tablet TAKE 1 TABLET DAILY   • olmesartan-hydrochlorothiazide (BENICAR HCT) 40-25 MG per tablet TAKE 1 TABLET DAILY   • simvastatin (ZOCOR) 20 mg tablet TAKE 1 TABLET DAILY IN THE EVENING   • [DISCONTINUED] semaglutide, 2 mg/dose, (Ozempic, 2 MG/DOSE,) 8 mg/ mL injection pen Inject 0 75 mL (2 mg total) under the skin once a week   • [DISCONTINUED] aspirin 81 MG tablet Take 81 mg by mouth daily   • [DISCONTINUED] ibuprofen (MOTRIN) 200 mg tablet  (Patient not taking: Reported on 2/10/2023)   • [DISCONTINUED] meclizine (ANTIVERT) 25 mg tablet Take 25 mg by mouth 3 (three) times a day as needed (Patient not taking: Reported on 2/10/2023)       Objective     /95 (BP Location: Left arm, Patient Position: Sitting, Cuff Size: Standard)   Pulse 72   Temp 97 9 °F (36 6 °C) (Tympanic)   Resp 18   Wt 107 kg (236 lb 6 4 oz)   SpO2 98%   BMI 33 92 kg/m²     Physical Exam  Vitals and nursing note reviewed  Constitutional:       Appearance: Normal appearance  He is obese  Cardiovascular:      Rate and Rhythm: Normal rate and regular rhythm  Pulses: Normal pulses  Heart sounds: Normal heart sounds  Pulmonary:      Effort: Pulmonary effort is normal       Breath sounds: Normal breath sounds  Abdominal:      General: Abdomen is flat  Bowel sounds are normal       Palpations: Abdomen is soft  Musculoskeletal:         General: Normal range of motion  Cervical back: Normal range of motion and neck supple  Skin:     General: Skin is warm and dry  Capillary Refill: Capillary refill takes less than 2 seconds  Neurological:      General: No focal deficit present  Mental Status: He is alert and oriented to person, place, and time  Mental status is at baseline  Psychiatric:         Mood and Affect: Mood normal          Behavior: Behavior normal          Thought Content:  Thought content normal          Judgment: Judgment normal        Charisse Pressley MD

## 2023-02-21 ENCOUNTER — TELEPHONE (OUTPATIENT)
Dept: FAMILY MEDICINE CLINIC | Facility: CLINIC | Age: 53
End: 2023-02-21

## 2023-02-27 NOTE — TELEPHONE ENCOUNTER
2 Attempts made to contact patient via phone  No answer and no call back  [employer] message also sent  Closing out referral to clinical pharmacy at this time

## 2023-04-24 DIAGNOSIS — I10 ESSENTIAL HYPERTENSION: ICD-10-CM

## 2023-04-24 RX ORDER — METOPROLOL SUCCINATE 50 MG/1
TABLET, EXTENDED RELEASE ORAL
Qty: 90 TABLET | Refills: 3 | Status: SHIPPED | OUTPATIENT
Start: 2023-04-24

## 2023-10-05 DIAGNOSIS — E11.65 TYPE 2 DIABETES MELLITUS WITH HYPERGLYCEMIA, WITHOUT LONG-TERM CURRENT USE OF INSULIN (HCC): ICD-10-CM

## 2023-10-05 RX ORDER — CANAGLIFLOZIN 300 MG/1
TABLET, FILM COATED ORAL
Qty: 90 TABLET | Refills: 3 | Status: SHIPPED | OUTPATIENT
Start: 2023-10-05

## 2023-10-25 DIAGNOSIS — Z79.4 TYPE 2 DIABETES MELLITUS WITHOUT COMPLICATION, WITH LONG-TERM CURRENT USE OF INSULIN (HCC): ICD-10-CM

## 2023-10-25 DIAGNOSIS — E11.9 TYPE 2 DIABETES MELLITUS WITHOUT COMPLICATION, WITH LONG-TERM CURRENT USE OF INSULIN (HCC): ICD-10-CM

## 2023-10-27 DIAGNOSIS — I10 ESSENTIAL HYPERTENSION: ICD-10-CM

## 2023-10-27 RX ORDER — OLMESARTAN MEDOXOMIL AND HYDROCHLOROTHIAZIDE 40/25 40; 25 MG/1; MG/1
TABLET ORAL
Qty: 90 TABLET | Refills: 3 | Status: SHIPPED | OUTPATIENT
Start: 2023-10-27

## 2023-11-20 DIAGNOSIS — E11.9 TYPE 2 DIABETES MELLITUS WITHOUT COMPLICATION, WITHOUT LONG-TERM CURRENT USE OF INSULIN (HCC): ICD-10-CM

## 2023-11-20 RX ORDER — GLIPIZIDE AND METFORMIN HCL 2.5; 5 MG/1; MG/1
TABLET, FILM COATED ORAL
Qty: 360 TABLET | Refills: 3 | Status: SHIPPED | OUTPATIENT
Start: 2023-11-20

## 2023-11-22 DIAGNOSIS — E11.65 TYPE 2 DIABETES MELLITUS WITH HYPERGLYCEMIA, WITHOUT LONG-TERM CURRENT USE OF INSULIN (HCC): Primary | ICD-10-CM

## 2023-12-22 ENCOUNTER — APPOINTMENT (OUTPATIENT)
Dept: LAB | Facility: MEDICAL CENTER | Age: 53
End: 2023-12-22
Payer: COMMERCIAL

## 2023-12-22 DIAGNOSIS — E11.65 TYPE 2 DIABETES MELLITUS WITH HYPERGLYCEMIA, WITHOUT LONG-TERM CURRENT USE OF INSULIN (HCC): ICD-10-CM

## 2023-12-22 DIAGNOSIS — E11.9 TYPE 2 DIABETES MELLITUS WITHOUT COMPLICATION, WITH LONG-TERM CURRENT USE OF INSULIN (HCC): ICD-10-CM

## 2023-12-22 DIAGNOSIS — Z79.4 TYPE 2 DIABETES MELLITUS WITHOUT COMPLICATION, WITH LONG-TERM CURRENT USE OF INSULIN (HCC): ICD-10-CM

## 2023-12-22 DIAGNOSIS — Z11.59 NEED FOR HEPATITIS C SCREENING TEST: ICD-10-CM

## 2023-12-22 LAB
ALBUMIN SERPL BCP-MCNC: 4.7 G/DL (ref 3.5–5)
ALP SERPL-CCNC: 63 U/L (ref 34–104)
ALT SERPL W P-5'-P-CCNC: 56 U/L (ref 7–52)
ANION GAP SERPL CALCULATED.3IONS-SCNC: 11 MMOL/L
AST SERPL W P-5'-P-CCNC: 34 U/L (ref 13–39)
BILIRUB SERPL-MCNC: 0.68 MG/DL (ref 0.2–1)
BUN SERPL-MCNC: 14 MG/DL (ref 5–25)
CALCIUM SERPL-MCNC: 9.6 MG/DL (ref 8.4–10.2)
CHLORIDE SERPL-SCNC: 100 MMOL/L (ref 96–108)
CHOLEST SERPL-MCNC: 116 MG/DL
CO2 SERPL-SCNC: 28 MMOL/L (ref 21–32)
CREAT SERPL-MCNC: 0.99 MG/DL (ref 0.6–1.3)
EST. AVERAGE GLUCOSE BLD GHB EST-MCNC: 194 MG/DL
GFR SERPL CREATININE-BSD FRML MDRD: 85 ML/MIN/1.73SQ M
GLUCOSE P FAST SERPL-MCNC: 97 MG/DL (ref 65–99)
HBA1C MFR BLD: 8.4 %
HDLC SERPL-MCNC: 41 MG/DL
LDLC SERPL CALC-MCNC: 41 MG/DL (ref 0–100)
NONHDLC SERPL-MCNC: 75 MG/DL
POTASSIUM SERPL-SCNC: 3.8 MMOL/L (ref 3.5–5.3)
PROT SERPL-MCNC: 7.6 G/DL (ref 6.4–8.4)
SODIUM SERPL-SCNC: 139 MMOL/L (ref 135–147)
TRIGL SERPL-MCNC: 169 MG/DL

## 2023-12-22 PROCEDURE — 80053 COMPREHEN METABOLIC PANEL: CPT

## 2023-12-22 PROCEDURE — 83036 HEMOGLOBIN GLYCOSYLATED A1C: CPT

## 2023-12-22 PROCEDURE — 86803 HEPATITIS C AB TEST: CPT

## 2023-12-22 PROCEDURE — 36415 COLL VENOUS BLD VENIPUNCTURE: CPT

## 2023-12-22 PROCEDURE — 80061 LIPID PANEL: CPT

## 2023-12-23 LAB — HCV AB SER QL: NORMAL

## 2024-01-11 ENCOUNTER — OFFICE VISIT (OUTPATIENT)
Dept: FAMILY MEDICINE CLINIC | Facility: CLINIC | Age: 54
End: 2024-01-11
Payer: COMMERCIAL

## 2024-01-11 VITALS
BODY MASS INDEX: 33.5 KG/M2 | HEART RATE: 74 BPM | OXYGEN SATURATION: 98 % | HEIGHT: 70 IN | DIASTOLIC BLOOD PRESSURE: 78 MMHG | SYSTOLIC BLOOD PRESSURE: 136 MMHG | WEIGHT: 234 LBS

## 2024-01-11 DIAGNOSIS — Z11.4 SCREENING FOR HIV WITHOUT PRESENCE OF RISK FACTORS: ICD-10-CM

## 2024-01-11 DIAGNOSIS — G56.02 CARPAL TUNNEL SYNDROME OF LEFT WRIST: ICD-10-CM

## 2024-01-11 DIAGNOSIS — M17.11 ARTHRITIS OF RIGHT KNEE: Primary | ICD-10-CM

## 2024-01-11 DIAGNOSIS — E11.65 TYPE 2 DIABETES MELLITUS WITH HYPERGLYCEMIA, WITHOUT LONG-TERM CURRENT USE OF INSULIN (HCC): ICD-10-CM

## 2024-01-11 PROCEDURE — 99213 OFFICE O/P EST LOW 20 MIN: CPT

## 2024-01-11 RX ORDER — NAPROXEN 500 MG/1
500 TABLET ORAL 2 TIMES DAILY WITH MEALS
Qty: 28 TABLET | Refills: 0 | Status: SHIPPED | OUTPATIENT
Start: 2024-01-11 | End: 2024-01-25

## 2024-01-11 RX ORDER — SEMAGLUTIDE 2.68 MG/ML
2 INJECTION, SOLUTION SUBCUTANEOUS
Qty: 9 ML | Refills: 0 | Status: SHIPPED | OUTPATIENT
Start: 2024-01-11

## 2024-01-19 PROBLEM — M17.11 ARTHRITIS OF RIGHT KNEE: Status: ACTIVE | Noted: 2024-01-19

## 2024-01-19 PROBLEM — G56.02 CARPAL TUNNEL SYNDROME OF LEFT WRIST: Status: ACTIVE | Noted: 2024-01-19

## 2024-01-19 NOTE — ASSESSMENT & PLAN NOTE
Lab Results   Component Value Date    HGBA1C 8.4 (H) 12/22/2023   Continue semaglutide 2mg SubQ Q7days  Continue Metaglip 2.5-500 BID  Changing from Invokana 300mg QD to empagliflozin 10mg QD b/c of insurance  Will continue to monitor, repeat A1c in 3mo

## 2024-01-19 NOTE — PROGRESS NOTES
Assessment/Plan:    Type 2 diabetes mellitus with hyperglycemia (HCC)  Lab Results   Component Value Date    HGBA1C 8.4 (H) 12/22/2023   Continue semaglutide 2mg SubQ Q7days  Continue Metaglip 2.5-500 BID  Changing from Invokana 300mg QD to empagliflozin 10mg QD b/c of insurance  Will continue to monitor, repeat A1c in 3mo    Carpal tunnel syndrome of left wrist  H/o carpal tunnel syndrome of L wrist  Referring to Hand surg for eval and treatment     Arthritis of right knee  3mo h/o lateral R knee pain when bending down  Pain not at pressure point, but deep to lateral intra-articular space   H/o intermittent pain likely 2/2 to arthritis of both knees  Pain reproduced when bending on R knee, PE otherwise unremarkable   Prescribing Naproxen 500mg BID w meals for 2weeks  Consider imaging if pain persists        Diagnoses and all orders for this visit:    Arthritis of right knee  -     naproxen (Naprosyn) 500 mg tablet; Take 1 tablet (500 mg total) by mouth 2 (two) times a day with meals for 14 days    Carpal tunnel syndrome of left wrist  -     Ambulatory Referral to Hand Surgery; Future    Type 2 diabetes mellitus with hyperglycemia, without long-term current use of insulin (HCC)  -     semaglutide, 2 mg/dose, (Ozempic, 2 MG/DOSE,) 8 mg/ mL injection pen; Inject 0.75 mL (2 mg total) under the skin every 7 days  -     Empagliflozin (Jardiance) 10 MG TABS tablet; Take 1 tablet (10 mg total) by mouth daily  -     Albumin / creatinine urine ratio; Future  -     Hemoglobin A1C; Future    Screening for HIV without presence of risk factors  -     HIV 1/2 AG/AB w Reflex [facility] for 2 yr old and above; Future          Subjective:      Patient ID: Be Berry is a 53 y.o. male.    Mr Berry is a 53y M w a PMH of DM2, being seen in office because of new knee pain, medication changes. The patient works as a , and has a longstanding h/o bilateral knee pain, likely 2/2 arthritis. Today he reports a 3mo h/o  lateral R knee pain when bending down. The pain is not experienced at the point at which the knee interacts with the ground, but is instead felt deep to lateral intra-articular space. Pain is reproducible with bending on the R knee, but PE otherwise unremarkable, without any appreciable swelling, no point tenderness. Bilateral mild crepitus. We will be prescribing Naproxen 500mg BID w meals for 2weeks for relief of likely acute inflammation, but will consider imaging if pain persists.     The patient also reports a h/o carpal tunnel syndrome of left wrist, previously diagnosed. Patient would like to speak with Hand surg for eval and treatment, which we will provide a referral to.     Of note, the patient was previously taking Invokana 300mg QD as part of his DM2 med regimen, but notes that insurance is pushing back on coverage. We will discontinue the Invokana, and switch the patient to empagliflozin 10mg QD. Will continue to monitor, repeat A1c in 3mo. Will also obtain diabetic foot exam, refer to ophtho for annual eye exam, review BMI at that visit.         The following portions of the patient's history were reviewed and updated as appropriate: allergies, current medications, past family history, past medical history, past social history, past surgical history, and problem list.    Review of Systems   Constitutional:  Negative for activity change, appetite change, chills, fatigue and fever.   HENT:  Negative for congestion, ear pain, postnasal drip, rhinorrhea and sore throat.    Eyes:  Negative for pain and visual disturbance.   Respiratory:  Negative for cough, chest tightness, shortness of breath and wheezing.    Cardiovascular:  Negative for chest pain, palpitations and leg swelling.   Gastrointestinal:  Negative for abdominal pain, constipation, diarrhea, nausea and vomiting.   Genitourinary:  Negative for dysuria, flank pain, frequency, hematuria and urgency.   Musculoskeletal:  Positive for arthralgias.  "Negative for back pain, joint swelling and myalgias.   Skin:  Negative for color change and rash.   Neurological:  Negative for dizziness, seizures, syncope, weakness, light-headedness, numbness and headaches.        Episodic L wrist pain, tingling    Psychiatric/Behavioral:  Negative for agitation, confusion and sleep disturbance. The patient is not nervous/anxious.    All other systems reviewed and are negative.        Objective:      /78   Pulse 74   Ht 5' 10\" (1.778 m)   Wt 106 kg (234 lb)   SpO2 98%   BMI 33.58 kg/m²          Physical Exam  Vitals reviewed.   Constitutional:       General: He is not in acute distress.     Appearance: Normal appearance. He is not ill-appearing.   HENT:      Head: Normocephalic and atraumatic.      Right Ear: External ear normal.      Left Ear: External ear normal.      Nose: Nose normal.      Mouth/Throat:      Mouth: Mucous membranes are moist.      Pharynx: Oropharynx is clear.   Eyes:      Extraocular Movements: Extraocular movements intact.      Conjunctiva/sclera: Conjunctivae normal.   Cardiovascular:      Rate and Rhythm: Normal rate and regular rhythm.      Pulses: Normal pulses.      Heart sounds: Normal heart sounds. No murmur heard.  Pulmonary:      Effort: Pulmonary effort is normal.      Breath sounds: Normal breath sounds. No wheezing.   Abdominal:      General: Abdomen is flat. Bowel sounds are normal.      Palpations: Abdomen is soft.      Tenderness: There is no abdominal tenderness.   Musculoskeletal:         General: No swelling, tenderness, deformity or signs of injury. Normal range of motion.      Cervical back: Normal range of motion and neck supple. No rigidity.      Comments: Lateral R knee pain reproduced when bending down  Deep to lateral intra-articular space   Mild crepitus of knees bilaterally  Otherwise nontender, no appreciable swelling    Skin:     General: Skin is warm and dry.      Findings: No erythema or rash.   Neurological:      " General: No focal deficit present.      Mental Status: He is alert and oriented to person, place, and time.      Motor: No weakness.      Gait: Gait normal.   Psychiatric:         Mood and Affect: Mood normal.         Behavior: Behavior normal.

## 2024-01-19 NOTE — ASSESSMENT & PLAN NOTE
3mo h/o lateral R knee pain when bending down  Pain not at pressure point, but deep to lateral intra-articular space   H/o intermittent pain likely 2/2 to arthritis of both knees  Pain reproduced when bending on R knee, PE otherwise unremarkable   Prescribing Naproxen 500mg BID w meals for 2weeks  Consider imaging if pain persists

## 2024-02-01 ENCOUNTER — OFFICE VISIT (OUTPATIENT)
Dept: OBGYN CLINIC | Facility: CLINIC | Age: 54
End: 2024-02-01
Payer: COMMERCIAL

## 2024-02-01 VITALS
HEART RATE: 76 BPM | SYSTOLIC BLOOD PRESSURE: 125 MMHG | DIASTOLIC BLOOD PRESSURE: 80 MMHG | BODY MASS INDEX: 33.5 KG/M2 | WEIGHT: 234 LBS | HEIGHT: 70 IN

## 2024-02-01 DIAGNOSIS — G56.02 CARPAL TUNNEL SYNDROME OF LEFT WRIST: Primary | ICD-10-CM

## 2024-02-01 PROCEDURE — 99203 OFFICE O/P NEW LOW 30 MIN: CPT | Performed by: ORTHOPAEDIC SURGERY

## 2024-02-01 NOTE — PROGRESS NOTES
Assessment/Plan:  1. Carpal tunnel syndrome of left wrist  Ambulatory Referral to Hand Surgery    EMG 1 Limb        Scribe Attestation      I,:  Ted Aguirre am acting as a scribe while in the presence of the attending physician.:       I,:  Hussain Menezes, DO personally performed the services described in this documentation    as scribed in my presence.:             Be upon examination demonstrates signs and symptoms consistent with carpal tunnel syndrome as well as some evidence of cubital tunnel syndrome.  He does have a positive Tinel's and Durkan's Phalen's maneuvers.  Overall his motor function is intact.  Based off his clinical exam, I do recommend ordering an EMG of the left upper extremity to question carpal tunnel syndrome and cubital tunnel syndrome.  Further notice and care will be determined upon the results of the EMG study.  He may continue with weightbearing activities as tolerated.  I will see him back once the EMG study of his left upper extremities completed.    The patient has carpal tunnel syndrome of his left wrist.  Treatment options were discussed.  He will obtain nerve conduction studies.  Return back once complete    Subjective:   Be Berry is a 53 y.o. male who presents for initial evaluation of his left hand.  He has a history of paresthesias into his thumb, index and long finger that has been ongoing for approximately 1 year.  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  He states that his symptoms are most pronounced during working hours and when he is using his hand.  He does experience intermittent nocturnal paresthesias that can wake him at night.  In addition to the paresthesias into the thumb, index and long finger he will experience intermittent paresthesias into the small finger and ring finger.  He denies any elbow pain or sensitivity currently.  He also denies any neck pain or history of cervical radiculopathy.  He does present to today's  visit with mild paresthesias into the fingers today.  He has not seek formal treatment for this and has not utilize bracing to aid in his symptom management.        Review of Systems   Constitutional:  Negative for chills, fever and unexpected weight change.   HENT:  Negative for hearing loss, nosebleeds and sore throat.    Eyes:  Negative for pain, redness and visual disturbance.   Respiratory:  Negative for cough, shortness of breath and wheezing.    Cardiovascular:  Negative for chest pain, palpitations and leg swelling.   Gastrointestinal:  Negative for abdominal pain, nausea and vomiting.   Endocrine: Negative for polyphagia and polyuria.   Genitourinary:  Negative for dysuria and hematuria.   Musculoskeletal:  Positive for arthralgias.        See HPI   Skin:  Negative for rash and wound.   Neurological:  Positive for numbness (Left hand). Negative for dizziness and headaches.   Psychiatric/Behavioral:  Negative for decreased concentration and suicidal ideas. The patient is not nervous/anxious.          Past Medical History:   Diagnosis Date    Asthma 01/17/2013    Last assessed    De Quervain's tenosynovitis, right 3/23/2017    Diabetes mellitus (HCC)     type 2    Hyperlipidemia     Hypertension     Sleep apnea        Past Surgical History:   Procedure Laterality Date    CLAVICLE SURGERY      INGUINAL HERNIA REPAIR Left     ORCHIECTOMY Left     [place] INCISION EXTENSOR TENDON SHEATH WRIST Right 3/23/2017    Procedure: RELEASE DEQUERVAINS;  Surgeon: Freedom Bajwa MD;  Location: [facility];  Service: Orthopedics    ROTATOR CUFF REPAIR Right        Family History   Problem Relation Age of Onset    Asthma Mother     Diabetes Mother     Atrial fibrillation Father        Social History     Occupational History    Not on file   Tobacco Use    Smoking status: Never    Smokeless tobacco: Never   Vaping Use    Vaping status: Never Used   Substance and Sexual Activity    Alcohol use: Yes     Comment: social    Drug use: No     Sexual activity: Not on file         Current Outpatient Medications:     Blood Glucose Monitoring Suppl (OneTouch Verio Reflect) w/Device KIT, Check blood sugars once daily. Please substitute with appropriate alternative as covered by patient's insurance. Dx: E11.65, Disp: 1 kit, Rfl: 0    Empagliflozin (Jardiance) 10 MG TABS tablet, Take 1 tablet (10 mg total) by mouth daily, Disp: 90 tablet, Rfl: 0    glipiZIDE-metFORMIN (METAGLIP) 2.5-500 MG per tablet, TAKE 2 TABLETS TWICE A DAY, Disp: 360 tablet, Rfl: 3    glucose blood (FREESTYLE LITE) test strip, Test daily., Disp: 100 each, Rfl: 5    glucose blood (OneTouch Verio) test strip, Check blood sugars once daily. Please substitute with appropriate alternative as covered by patient's insurance. Dx: E11.65, Disp: 100 each, Rfl: 3    meclizine (ANTIVERT) 25 mg tablet, Take 1 tablet (25 mg total) by mouth 3 (three) times a day as needed for dizziness, Disp: 30 tablet, Rfl: 0    metoprolol succinate (TOPROL-XL) 50 mg 24 hr tablet, TAKE 1 TABLET DAILY, Disp: 90 tablet, Rfl: 3    olmesartan-hydrochlorothiazide (BENICAR HCT) 40-25 MG per tablet, TAKE 1 TABLET DAILY, Disp: 90 tablet, Rfl: 3    OneTouch Delica Lancets 33G MISC, Check blood sugars once daily. Please substitute with appropriate alternative as covered by patient's insurance. Dx: E11.65, Disp: 100 each, Rfl: 3    semaglutide, 2 mg/dose, (Ozempic, 2 MG/DOSE,) 8 mg/ mL injection pen, Inject 0.75 mL (2 mg total) under the skin every 7 days, Disp: 9 mL, Rfl: 0    simvastatin (ZOCOR) 20 mg tablet, TAKE 1 TABLET DAILY IN THE EVENING, Disp: 90 tablet, Rfl: 3    naproxen (Naprosyn) 500 mg tablet, Take 1 tablet (500 mg total) by mouth 2 (two) times a day with meals for 14 days, Disp: 28 tablet, Rfl: 0    No Known Allergies    Objective:  Vitals:    02/01/24 1037   BP: 125/80   Pulse: 76       Left Hand Exam     Range of Motion   Wrist   Extension:  45   Flexion:  70   Pronation:  90   Supination:  90     Muscle  Strength   Wrist extension: 5/5   Wrist flexion: 5/5   :  4/5     Tests   Phalen’s sign: positive  Tinel's sign (median nerve): positive    Other   Erythema: absent  Scars: absent  Sensation: decreased (median nerve dirstubition)  Pulse: present    Comments:  Tinel's elbow: positive          Strength/Myotome Testing     Left Wrist/Hand   Wrist extension: 5  Wrist flexion: 5    Tests     Left Wrist/Hand   Positive Phalen's sign and Tinel's sign (medial nerve).       Physical Exam  Vitals reviewed.   HENT:      Head: Normocephalic and atraumatic.   Eyes:      General:         Right eye: No discharge.         Left eye: No discharge.      Conjunctiva/sclera: Conjunctivae normal.   Cardiovascular:      Rate and Rhythm: Normal rate.   Pulmonary:      Effort: Pulmonary effort is normal. No respiratory distress.   Musculoskeletal:      Cervical back: Normal range of motion and neck supple.      Comments: As noted in Ortho exam   Skin:     General: Skin is warm and dry.   Neurological:      Mental Status: He is alert and oriented to person, place, and time.   Psychiatric:         Mood and Affect: Mood normal.         Behavior: Behavior normal.         I have personally reviewed pertinent films in PACS and my interpretation is as follows:  No images reviewed today      This document was created using speech voice recognition software.   Grammatical errors, random word insertions, pronoun errors, and incomplete sentences are an occasional consequence of this system due to software limitations, ambient noise, and hardware issues.   Any formal questions or concerns about content, text, or information contained within the body of this dictation should be directly addressed to the provider for clarification.

## 2024-03-25 DIAGNOSIS — E11.65 TYPE 2 DIABETES MELLITUS WITH HYPERGLYCEMIA, WITHOUT LONG-TERM CURRENT USE OF INSULIN (HCC): ICD-10-CM

## 2024-03-25 RX ORDER — EMPAGLIFLOZIN 10 MG/1
10 TABLET, FILM COATED ORAL DAILY
Qty: 90 TABLET | Refills: 3 | Status: SHIPPED | OUTPATIENT
Start: 2024-03-25

## 2024-03-28 DIAGNOSIS — E11.65 TYPE 2 DIABETES MELLITUS WITH HYPERGLYCEMIA, WITHOUT LONG-TERM CURRENT USE OF INSULIN (HCC): ICD-10-CM

## 2024-03-28 RX ORDER — SEMAGLUTIDE 2.68 MG/ML
INJECTION, SOLUTION SUBCUTANEOUS
Qty: 9 ML | Refills: 3 | Status: SHIPPED | OUTPATIENT
Start: 2024-03-28

## 2024-04-08 DIAGNOSIS — I10 ESSENTIAL HYPERTENSION: ICD-10-CM

## 2024-04-09 RX ORDER — SIMVASTATIN 20 MG
TABLET ORAL
Qty: 90 TABLET | Refills: 3 | Status: SHIPPED | OUTPATIENT
Start: 2024-04-09

## 2024-04-17 DIAGNOSIS — I10 ESSENTIAL HYPERTENSION: ICD-10-CM

## 2024-04-17 RX ORDER — METOPROLOL SUCCINATE 50 MG/1
TABLET, EXTENDED RELEASE ORAL
Qty: 90 TABLET | Refills: 3 | Status: SHIPPED | OUTPATIENT
Start: 2024-04-17

## 2024-04-22 ENCOUNTER — PROCEDURE VISIT (OUTPATIENT)
Dept: NEUROLOGY | Facility: CLINIC | Age: 54
End: 2024-04-22
Payer: COMMERCIAL

## 2024-04-22 DIAGNOSIS — G56.02 CARPAL TUNNEL SYNDROME OF LEFT WRIST: ICD-10-CM

## 2024-04-22 PROCEDURE — 95909 NRV CNDJ TST 5-6 STUDIES: CPT | Performed by: PHYSICAL MEDICINE & REHABILITATION

## 2024-04-22 PROCEDURE — 95886 MUSC TEST DONE W/N TEST COMP: CPT | Performed by: PHYSICAL MEDICINE & REHABILITATION

## 2024-05-13 DIAGNOSIS — T75.3XXA SEA SICKNESS, INITIAL ENCOUNTER: Primary | ICD-10-CM

## 2024-05-13 RX ORDER — SCOLOPAMINE TRANSDERMAL SYSTEM 1 MG/1
1 PATCH, EXTENDED RELEASE TRANSDERMAL
Qty: 4 PATCH | Refills: 5 | Status: SHIPPED | OUTPATIENT
Start: 2024-05-13

## 2024-06-06 ENCOUNTER — TELEPHONE (OUTPATIENT)
Dept: FAMILY MEDICINE CLINIC | Facility: CLINIC | Age: 54
End: 2024-06-06

## 2024-06-06 DIAGNOSIS — R69 SICK: Primary | ICD-10-CM

## 2024-06-06 RX ORDER — AMOXICILLIN 500 MG/1
500 CAPSULE ORAL EVERY 8 HOURS SCHEDULED
Qty: 21 CAPSULE | Refills: 0 | Status: SHIPPED | OUTPATIENT
Start: 2024-06-06 | End: 2024-06-13

## 2024-06-06 NOTE — TELEPHONE ENCOUNTER
Pt called, states he was on a cruise and developed sinus infection.  Asking if abx can be sent to pharmacy.  ty

## 2024-06-20 ENCOUNTER — VBI (OUTPATIENT)
Dept: ADMINISTRATIVE | Facility: OTHER | Age: 54
End: 2024-06-20

## 2024-06-20 NOTE — TELEPHONE ENCOUNTER
06/20/24 1:50 PM     Chart reviewed for Diabetic Eye Exam ; nothing is submitted to the patient's insurance at this time.     Kanwal Lopez   [facility]

## 2024-06-25 ENCOUNTER — PREP FOR PROCEDURE (OUTPATIENT)
Dept: OBGYN CLINIC | Facility: CLINIC | Age: 54
End: 2024-06-25

## 2024-06-25 ENCOUNTER — OFFICE VISIT (OUTPATIENT)
Dept: OBGYN CLINIC | Facility: CLINIC | Age: 54
End: 2024-06-25
Payer: COMMERCIAL

## 2024-06-25 VITALS
BODY MASS INDEX: 33.5 KG/M2 | WEIGHT: 234 LBS | DIASTOLIC BLOOD PRESSURE: 71 MMHG | SYSTOLIC BLOOD PRESSURE: 114 MMHG | HEIGHT: 70 IN | HEART RATE: 81 BPM

## 2024-06-25 DIAGNOSIS — G56.02 CARPAL TUNNEL SYNDROME OF LEFT WRIST: Primary | ICD-10-CM

## 2024-06-25 DIAGNOSIS — G56.02 CARPAL TUNNEL SYNDROME ON LEFT: Primary | ICD-10-CM

## 2024-06-25 PROCEDURE — 99213 OFFICE O/P EST LOW 20 MIN: CPT | Performed by: ORTHOPAEDIC SURGERY

## 2024-06-25 RX ORDER — CHLORHEXIDINE GLUCONATE ORAL RINSE 1.2 MG/ML
15 SOLUTION DENTAL ONCE
OUTPATIENT
Start: 2024-06-25 | End: 2024-06-25

## 2024-06-25 NOTE — PROGRESS NOTES
Assessment/Plan:   Diagnoses and all orders for this visit:    Carpal tunnel syndrome on left         The patient's EMG results and exam are consistent with carpal tunnel syndrome of his left wrist. Discussed the patient's diagnosis and associated treatment options including conservative and surgical treatment. Discussed risks, potential complications, and benefits of surgical procedure in great detail. The patient understands the risks and benefits of all mention treatment options and has no further questions.  The patient has elected to proceed forward with left carpal tunnel release. Surgery is tentatively scheduled for September 16, 2024. He will be seen for follow-up 10 to 14 days post-op for reevaluation. A surgical consent was obtained at today's visit. The patient expressed understanding and had the opportunity to ask questions.     The patient has symptomatic carpal tunnel syndrome of his left wrist.  Treatment options were discussed.  Is opted for elective left carpal tunnel release.  All risk, complications, and benefits were discussed with the patient in great detail including bleeding, infection, blood clots, pain, stiffness, neurovascular damage, fractures, dislocations, the possibility loss elective surgery, wound healing problems, partial or worsening nerve recovery, etc.  His surgery scheduled for September 16, 2024      Subjective:   Patient ID: Be Berry  1970     HPI  Patient is a 53 y.o. male who presents for follow-up evaluation of his left wrist. The patient was last seen on 2/1/2024 where an EMG was ordered to evaluate for carpal tunnel syndrome. The patient had the EMG completed. He states that the symptoms have been present for approximately 2 years. He reports numbness and tingling in his thumb, index, and long fingers. He reports decreased dexterity, with difficulty picking up small items. He denies symptoms when riding motorcycles. He reports increased symptoms when  working and at night.      The following portions of the patient's history were reviewed and updated as appropriate:  Past medical history, past surgical history, Family history, social history, current medications and allergies    Past Medical History:   Diagnosis Date    Asthma 01/17/2013    Last assessed    De Quervain's tenosynovitis, right 3/23/2017    Diabetes mellitus (HCC)     type 2    Hyperlipidemia     Hypertension     Sleep apnea        Past Surgical History:   Procedure Laterality Date    CLAVICLE SURGERY      INGUINAL HERNIA REPAIR Left     ORCHIECTOMY Left     [place] INCISION EXTENSOR TENDON SHEATH WRIST Right 3/23/2017    Procedure: RELEASE DEQUERVAINS;  Surgeon: Freedom Bajwa MD;  Location: [facility];  Service: Orthopedics    ROTATOR CUFF REPAIR Right        Family History   Problem Relation Age of Onset    Asthma Mother     Diabetes Mother     Atrial fibrillation Father        Social History     Socioeconomic History    Marital status: /Civil Union     Spouse name: None    Number of children: None    Years of education: None    Highest education level: None   Occupational History    None   Tobacco Use    Smoking status: Never    Smokeless tobacco: Never   Vaping Use    Vaping status: Never Used   Substance and Sexual Activity    Alcohol use: Yes     Comment: social    Drug use: No    Sexual activity: None   Other Topics Concern    None   Social History Narrative    None     Social Determinants of Health     Financial Resource Strain: Not on file   Food Insecurity: Not on file   Transportation Needs: Not on file   Physical Activity: Not on file   Stress: Not on file   Social Connections: Unknown (6/18/2024)    Received from [facility]     How often do you feel lonely or isolated from those around you? (Adult - for ages 18 years and over): Not on file   Intimate Partner Violence: Not on file   Housing Stability: Not on file         Current Outpatient Medications:     Blood  Glucose Monitoring Suppl (OneTouch Verio Reflect) w/Device KIT, Check blood sugars once daily. Please substitute with appropriate alternative as covered by patient's insurance. Dx: E11.65, Disp: 1 kit, Rfl: 0    glipiZIDE-metFORMIN (METAGLIP) 2.5-500 MG per tablet, TAKE 2 TABLETS TWICE A DAY, Disp: 360 tablet, Rfl: 3    glucose blood (FREESTYLE LITE) test strip, Test daily., Disp: 100 each, Rfl: 5    glucose blood (OneTouch Verio) test strip, Check blood sugars once daily. Please substitute with appropriate alternative as covered by patient's insurance. Dx: E11.65, Disp: 100 each, Rfl: 3    Jardiance 10 MG TABS tablet, TAKE 1 TABLET DAILY, Disp: 90 tablet, Rfl: 3    meclizine (ANTIVERT) 25 mg tablet, Take 1 tablet (25 mg total) by mouth 3 (three) times a day as needed for dizziness, Disp: 30 tablet, Rfl: 0    metoprolol succinate (TOPROL-XL) 50 mg 24 hr tablet, TAKE 1 TABLET DAILY, Disp: 90 tablet, Rfl: 3    olmesartan-hydrochlorothiazide (BENICAR HCT) 40-25 MG per tablet, TAKE 1 TABLET DAILY, Disp: 90 tablet, Rfl: 3    OneTouch Delica Lancets 33G MISC, Check blood sugars once daily. Please substitute with appropriate alternative as covered by patient's insurance. Dx: E11.65, Disp: 100 each, Rfl: 3    Ozempic, 2 MG/DOSE, 8 MG/3ML injection pen, INJECT 2 MG UNDER THE SKIN EVERY 7 DAYS, Disp: 9 mL, Rfl: 3    scopolamine (TRANSDERM-SCOP) 1 mg/3 days TD 72 hr patch, Place 1 patch on the skin over 72 hours every third day, Disp: 4 patch, Rfl: 5    simvastatin (ZOCOR) 20 mg tablet, TAKE 1 TABLET DAILY IN THE EVENING, Disp: 90 tablet, Rfl: 3    naproxen (Naprosyn) 500 mg tablet, Take 1 tablet (500 mg total) by mouth 2 (two) times a day with meals for 14 days, Disp: 28 tablet, Rfl: 0    No Known Allergies    Review of Systems   Constitutional:  Negative for chills and fever.   HENT:  Negative for ear pain and sore throat.    Eyes:  Negative for pain and visual disturbance.   Respiratory:  Negative for cough and shortness of  "breath.    Cardiovascular:  Negative for chest pain and palpitations.   Gastrointestinal:  Negative for abdominal pain and vomiting.   Genitourinary:  Negative for dysuria and hematuria.   Musculoskeletal:  Negative for arthralgias and back pain.   Skin:  Negative for color change and rash.   Neurological:  Negative for seizures and syncope.   All other systems reviewed and are negative.       Objective:  /71 (BP Location: Right arm, Patient Position: Sitting, Cuff Size: Large)   Pulse 81   Ht 5' 10\" (1.778 m)   Wt 106 kg (234 lb) Comment: reported  BMI 33.58 kg/m²     Ortho Exam  left wrist -  Patient presents with no obvious anatomical deformity  Skin is warm and dry to touch with no signs of erythema, ecchymosis, infection  ROM WNL  Strength: 4/5 throughout  No soft tissue swelling or effusion noted  Full FDS, FDP, extensor mechanisms are intact  - Thenar atrophy, - intrinsic atrophy  + Tinel's at carpal tunnel  + Carpal Tunnel Compression  Demonstrates normal wrist, elbow, and shoulder motion  Forearm compartments are soft and supple  2+ Distal radial pulse with brisk capillary refill to the fingers  Radial, median, ulnar motor and sensory distribution intact  Sensation to light touch intact distally      Physical Exam  HENT:      Head: Normocephalic and atraumatic.      Nose: Nose normal.   Eyes:      Conjunctiva/sclera: Conjunctivae normal.   Cardiovascular:      Rate and Rhythm: Normal rate.   Pulmonary:      Effort: Pulmonary effort is normal.   Musculoskeletal:      Cervical back: Neck supple.   Skin:     General: Skin is warm and dry.      Capillary Refill: Capillary refill takes less than 2 seconds.   Neurological:      Mental Status: He is alert and oriented to person, place, and time.   Psychiatric:         Mood and Affect: Mood normal.         Behavior: Behavior normal.          Diagnostic Test Review:    EMG of left wrist obtained on 4/22/2024 were reviewed and demonstrate: " electrophysiologic evidence of :   1. Moderate median nerve compression neuropathy at the wrist with demyelinative changes, consistent with a diagnosis of carpal tunnel syndrome.   2. There is no evidence of a median neuropathy at the elbow.   3. There is no evidence of a ulnar neuropathy or cervical radiculopathy.    Procedures   None performed.     Scribe Attestation      I,:  Tray Coello am acting as a scribe while in the presence of the attending physician.:       I,:  Hussain Menezes, DO personally performed the services described in this documentation    as scribed in my presence.:

## 2024-08-10 DIAGNOSIS — R69 SICK: ICD-10-CM

## 2024-08-27 DIAGNOSIS — G56.02 CARPAL TUNNEL SYNDROME ON LEFT: Primary | ICD-10-CM

## 2024-08-30 ENCOUNTER — TELEPHONE (OUTPATIENT)
Dept: FAMILY MEDICINE CLINIC | Facility: CLINIC | Age: 54
End: 2024-08-30

## 2024-08-30 DIAGNOSIS — R69 SICK: Primary | ICD-10-CM

## 2024-08-30 RX ORDER — AZITHROMYCIN 250 MG/1
TABLET, FILM COATED ORAL
Qty: 6 TABLET | Refills: 0 | Status: SHIPPED | OUTPATIENT
Start: 2024-08-30 | End: 2024-09-04

## 2024-08-30 RX ORDER — AMOXICILLIN 500 MG/1
CAPSULE ORAL
Qty: 21 CAPSULE | Refills: 0 | OUTPATIENT
Start: 2024-08-30

## 2024-08-30 NOTE — TELEPHONE ENCOUNTER
Pt called, states he is due for surgery next week but is having cold symptoms.  Was told to get abx from pcp to take so surgery does not have to get r/s.  ty

## 2024-09-03 ENCOUNTER — APPOINTMENT (OUTPATIENT)
Dept: LAB | Facility: MEDICAL CENTER | Age: 54
End: 2024-09-03
Payer: COMMERCIAL

## 2024-09-03 DIAGNOSIS — E11.9 TYPE 2 DIABETES MELLITUS WITHOUT COMPLICATION, WITH LONG-TERM CURRENT USE OF INSULIN (HCC): ICD-10-CM

## 2024-09-03 DIAGNOSIS — Z79.4 TYPE 2 DIABETES MELLITUS WITHOUT COMPLICATION, WITH LONG-TERM CURRENT USE OF INSULIN (HCC): ICD-10-CM

## 2024-09-03 DIAGNOSIS — G56.02 CARPAL TUNNEL SYNDROME ON LEFT: ICD-10-CM

## 2024-09-03 LAB
ANION GAP SERPL CALCULATED.3IONS-SCNC: 12 MMOL/L (ref 4–13)
BUN SERPL-MCNC: 16 MG/DL (ref 5–25)
CALCIUM SERPL-MCNC: 9.5 MG/DL (ref 8.4–10.2)
CHLORIDE SERPL-SCNC: 98 MMOL/L (ref 96–108)
CO2 SERPL-SCNC: 28 MMOL/L (ref 21–32)
CREAT SERPL-MCNC: 1.09 MG/DL (ref 0.6–1.3)
EST. AVERAGE GLUCOSE BLD GHB EST-MCNC: 163 MG/DL
GFR SERPL CREATININE-BSD FRML MDRD: 75 ML/MIN/1.73SQ M
GLUCOSE P FAST SERPL-MCNC: 118 MG/DL (ref 65–99)
HBA1C MFR BLD: 7.3 %
POTASSIUM SERPL-SCNC: 3.9 MMOL/L (ref 3.5–5.3)
SODIUM SERPL-SCNC: 138 MMOL/L (ref 135–147)

## 2024-09-03 PROCEDURE — 83036 HEMOGLOBIN GLYCOSYLATED A1C: CPT

## 2024-09-03 PROCEDURE — 36415 COLL VENOUS BLD VENIPUNCTURE: CPT

## 2024-09-03 PROCEDURE — 80048 BASIC METABOLIC PNL TOTAL CA: CPT

## 2024-09-03 NOTE — PRE-PROCEDURE INSTRUCTIONS
Pre-Surgery Instructions:   Medication Instructions    glipiZIDE-metFORMIN (METAGLIP) 2.5-500 MG per tablet Hold day of surgery.    Glucosamine-Chondroitin (GLUCOSAMINE CHONDR COMPLEX PO) Stop taking 7 days prior to surgery.    Jardiance 10 MG TABS tablet Stop taking 4 days prior to surgery.    meclizine (ANTIVERT) 25 mg tablet Uses PRN- OK to take day of surgery    metoprolol succinate (TOPROL-XL) 50 mg 24 hr tablet Take day of surgery.    olmesartan-hydrochlorothiazide (BENICAR HCT) 40-25 MG per tablet Hold day of surgery.    simvastatin (ZOCOR) 20 mg tablet Take day of surgery.    Medication instructions for day surgery reviewed. Please use only a sip of water to take your instructed medications. Avoid all over the counter vitamins, supplements and NSAIDS for one week prior to surgery per anesthesia guidelines. Tylenol is ok to take as needed.     You will receive a call one business day prior to surgery with an arrival time and hospital directions. If your surgery is scheduled on a Monday, the hospital will be calling you on the Friday prior to your surgery. If you have not heard from anyone by 8pm, please call the hospital supervisor through the hospital  at [phone]. ([place] [phone] or [place] [phone]).    Do not eat or drink anything after midnight the night before your surgery, including candy, mints, lifesavers, or chewing gum. Do not drink alcohol 24hrs before your surgery. Try not to smoke at least 24hrs before your surgery.       Follow the pre surgery showering instructions as listed in the “My Surgical Experience Booklet” or otherwise provided by your surgeon's office. Do not use a blade to shave the surgical area 1 week before surgery. It is okay to use a clean electric clippers up to 24 hours before surgery. Do not apply any lotions, creams, including makeup, cologne, deodorant, or perfumes after showering on the day of your surgery. Do not use dry shampoo, hair spray, hair  gel, or any type of hair products.     No contact lenses, eye make-up, or artificial eyelashes. Remove nail polish, including gel polish, and any artificial, gel, or acrylic nails if possible. Remove all jewelry including rings and body piercing jewelry.     Wear causal clothing that is easy to take on and off. Consider your type of surgery.    Keep any valuables, jewelry, piercings at home. Please bring any specially ordered equipment (sling, braces) if indicated.    Arrange for a responsible person to drive you to and from the hospital on the day of your surgery. Please confirm the visitor policy for the day of your procedure when you receive your phone call with an arrival time.     Call the surgeon's office with any new illnesses, exposures, or additional questions prior to surgery.    Please reference your “My Surgical Experience Booklet” for additional information to prepare for your upcoming surgery.

## 2024-09-04 ENCOUNTER — OFFICE VISIT (OUTPATIENT)
Dept: LAB | Facility: HOSPITAL | Age: 54
End: 2024-09-04
Attending: ORTHOPAEDIC SURGERY
Payer: COMMERCIAL

## 2024-09-04 DIAGNOSIS — G56.02 CARPAL TUNNEL SYNDROME ON LEFT: ICD-10-CM

## 2024-09-04 PROCEDURE — 93005 ELECTROCARDIOGRAM TRACING: CPT

## 2024-09-04 RX ORDER — BLOOD SUGAR DIAGNOSTIC
STRIP MISCELLANEOUS
Qty: 100 EACH | Refills: 0 | Status: SHIPPED | OUTPATIENT
Start: 2024-09-04

## 2024-09-05 LAB
ATRIAL RATE: 65 BPM
P AXIS: 27 DEGREES
PR INTERVAL: 182 MS
QRS AXIS: -21 DEGREES
QRSD INTERVAL: 88 MS
QT INTERVAL: 422 MS
QTC INTERVAL: 438 MS
T WAVE AXIS: 36 DEGREES
VENTRICULAR RATE: 65 BPM

## 2024-09-05 PROCEDURE — 93010 ELECTROCARDIOGRAM REPORT: CPT | Performed by: INTERNAL MEDICINE

## 2024-09-13 NOTE — PROGRESS NOTES
PT Evaluation     Today's date: 2024  Patient name: Be Berry  : 1970  MRN: [id number]  Referring provider: Hussain Menezes DO  Dx:   Encounter Diagnosis     ICD-10-CM    1. Carpal tunnel syndrome on left  G56.02                      Assessment    Assessment details: Pt is a 55 YO male presenting to PT with pain, decreased AROM, strength and tolerance to activity.  Pt would benefit from skilled intervention to address these issues and maximize overall function.  Occupation- [employer]- off for 2 weeks  Dominant-  right; Involved- left CT         Goals  ST.  Decrease pain to 0-2/10 in 4-8 weeks to ease ADL and self care            2.  Decrease swelling 0.5 cm in 4-8 weeks            3.  Increase AROM 10-20 degrees in 4 weeks for improved ability to bathe, dress, and assist in functional activity            4.  Improve  strength by 5 lbs in 4 weeks            5.  Provide orthotic for protection, compression for support            6.  Instruct in activity modification for ADL and self care with independence in 4-6 weeks            7.  Instruct in HEP   LT.  Increase functional AROM to assist with independence in 8-12 weeks            2.  [place] with HEP in 4-6 weeks            3.  Increase  strength by 10 lbs in 8 weeks            4. Recreational activities are improved to maximum level in 12 weeks.            5.  ADL performance is improved to maximal level of function in 12 weeks.            6. Ability to RTW by DC        Plan  Patient would benefit from: skilled physical therapy  Planned modality interventions: cryotherapy, ultrasound, thermotherapy: hydrocollator packs and neuromuscular electric stimulation    Planned therapy interventions: activity modification, manual therapy, neuromuscular re-education, strengthening, stretching, therapeutic activities, therapeutic exercise and home exercise program    Frequency: 2x week  Duration in weeks: 4  Treatment plan discussed  with: patient        Subjective Evaluation    History of Present Illness  Date of surgery: 2024  Mechanism of injury: 2 year progression of numbness, tingling left T, IF, MF.  Left CT surgery 24  Patient Goals  Patient goals for therapy: decreased edema, decreased pain, increased motion, increased strength, independence with ADLs/IADLs, return to sport/leisure activities and return to work    Pain  Current pain ratin  At best pain ratin  At worst pain ratin  Location: IF, MF numbness  Quality: dull ache    Hand dominance: right    Treatments  Current treatment: physical therapy        Objective     General Comments:      Wrist/Hand Comments  AROM FA S/P- 70/70; wrist E/F- 45/30;               Digits- MP- 15/55; PIP- 25/70; DIP- 0/15              Thumb MP- 0/15; IP- 0/50; opposition- -7.0 cm  Strength- un-assessed  Post-op dressings removed- sutures present, no bleeding apparent  Circumference at wrist- 21.0 cm; MPs- 24.0 cm; Thumb P1- 8.8 cm; MF P1- 8.2 cm  Sensation- pt notes numbness persists IF/MF  Pt instructed in MNGE, AROM, elevation, wound care.    Provided with isotoner, tg-soft for compression      Flowsheet Rows      Flowsheet Row Most Recent Value   PT/OT G-Codes    Current Score 4   Projected Score 54               Precautions: avoid lifting, loading, heavy or repetitive grasp for 3-4 weeks after surgery      Manuals             STM 23            isotoner L Lg            Tg-soft Med                         Neuro Re-Ed             HP/pulsed biph 15                                                   Ther Ex             MNGE 5x            AROM wrist  ---->                        thumb ---->                                                                                                                                                           Modalities             US ----> ------> ----->          CP 15

## 2024-09-16 ENCOUNTER — ANESTHESIA EVENT (OUTPATIENT)
Dept: PERIOP | Facility: HOSPITAL | Age: 54
End: 2024-09-16
Payer: COMMERCIAL

## 2024-09-16 ENCOUNTER — HOSPITAL ENCOUNTER (OUTPATIENT)
Facility: HOSPITAL | Age: 54
Setting detail: OUTPATIENT SURGERY
Discharge: HOME | End: 2024-09-16
Attending: ORTHOPAEDIC SURGERY | Admitting: ORTHOPAEDIC SURGERY
Payer: COMMERCIAL

## 2024-09-16 ENCOUNTER — ANESTHESIA (OUTPATIENT)
Dept: PERIOP | Facility: HOSPITAL | Age: 54
End: 2024-09-16
Payer: COMMERCIAL

## 2024-09-16 VITALS
OXYGEN SATURATION: 97 % | BODY MASS INDEX: 33.33 KG/M2 | SYSTOLIC BLOOD PRESSURE: 135 MMHG | HEIGHT: 69 IN | DIASTOLIC BLOOD PRESSURE: 84 MMHG | TEMPERATURE: 98.8 F | HEART RATE: 57 BPM | WEIGHT: 225 LBS | RESPIRATION RATE: 18 BRPM

## 2024-09-16 DIAGNOSIS — G56.02 CARPAL TUNNEL SYNDROME ON LEFT: ICD-10-CM

## 2024-09-16 LAB
GLUCOSE SERPL-MCNC: 152 MG/DL (ref 65–140)
GLUCOSE SERPL-MCNC: 174 MG/DL (ref 65–140)

## 2024-09-16 PROCEDURE — NC001 PR NO CHARGE: Performed by: ORTHOPAEDIC SURGERY

## 2024-09-16 PROCEDURE — 64721 CARPAL TUNNEL SURGERY: CPT | Performed by: ORTHOPAEDIC SURGERY

## 2024-09-16 PROCEDURE — 82948 REAGENT STRIP/BLOOD GLUCOSE: CPT

## 2024-09-16 PROCEDURE — 64721 CARPAL TUNNEL SURGERY: CPT | Performed by: PHYSICIAN ASSISTANT

## 2024-09-16 PROCEDURE — 88313 SPECIAL STAINS GROUP 2: CPT | Performed by: PATHOLOGY

## 2024-09-16 PROCEDURE — 88304 TISSUE EXAM BY PATHOLOGIST: CPT | Performed by: PATHOLOGY

## 2024-09-16 RX ORDER — SODIUM CHLORIDE, SODIUM LACTATE, POTASSIUM CHLORIDE, CALCIUM CHLORIDE 600; 310; 30; 20 MG/100ML; MG/100ML; MG/100ML; MG/100ML
INJECTION, SOLUTION INTRAVENOUS CONTINUOUS PRN
Status: DISCONTINUED | OUTPATIENT
Start: 2024-09-16 | End: 2024-09-16

## 2024-09-16 RX ORDER — FENTANYL CITRATE 50 UG/ML
INJECTION, SOLUTION INTRAMUSCULAR; INTRAVENOUS AS NEEDED
Status: DISCONTINUED | OUTPATIENT
Start: 2024-09-16 | End: 2024-09-16

## 2024-09-16 RX ORDER — ONDANSETRON 2 MG/ML
INJECTION INTRAMUSCULAR; INTRAVENOUS AS NEEDED
Status: DISCONTINUED | OUTPATIENT
Start: 2024-09-16 | End: 2024-09-16

## 2024-09-16 RX ORDER — ONDANSETRON 2 MG/ML
4 INJECTION INTRAMUSCULAR; INTRAVENOUS EVERY 6 HOURS PRN
Status: DISCONTINUED | OUTPATIENT
Start: 2024-09-16 | End: 2024-09-16 | Stop reason: HOSPADM

## 2024-09-16 RX ORDER — TRAMADOL HYDROCHLORIDE 50 MG/1
50 TABLET ORAL EVERY 6 HOURS PRN
Qty: 21 TABLET | Refills: 0 | Status: SHIPPED | OUTPATIENT
Start: 2024-09-16 | End: 2024-09-26

## 2024-09-16 RX ORDER — MIDAZOLAM HYDROCHLORIDE 2 MG/2ML
INJECTION, SOLUTION INTRAMUSCULAR; INTRAVENOUS AS NEEDED
Status: DISCONTINUED | OUTPATIENT
Start: 2024-09-16 | End: 2024-09-16

## 2024-09-16 RX ORDER — SODIUM CHLORIDE, SODIUM LACTATE, POTASSIUM CHLORIDE, CALCIUM CHLORIDE 600; 310; 30; 20 MG/100ML; MG/100ML; MG/100ML; MG/100ML
125 INJECTION, SOLUTION INTRAVENOUS CONTINUOUS
Status: ACTIVE | OUTPATIENT
Start: 2024-09-16 | End: 2024-09-16

## 2024-09-16 RX ORDER — BUPIVACAINE HYDROCHLORIDE 5 MG/ML
INJECTION, SOLUTION EPIDURAL; INTRACAUDAL AS NEEDED
Status: DISCONTINUED | OUTPATIENT
Start: 2024-09-16 | End: 2024-09-16 | Stop reason: HOSPADM

## 2024-09-16 RX ORDER — CHLORHEXIDINE GLUCONATE ORAL RINSE 1.2 MG/ML
15 SOLUTION DENTAL ONCE
Status: COMPLETED | OUTPATIENT
Start: 2024-09-16 | End: 2024-09-16

## 2024-09-16 RX ORDER — ONDANSETRON 2 MG/ML
4 INJECTION INTRAMUSCULAR; INTRAVENOUS ONCE AS NEEDED
Status: DISCONTINUED | OUTPATIENT
Start: 2024-09-16 | End: 2024-09-16 | Stop reason: HOSPADM

## 2024-09-16 RX ORDER — PROPOFOL 10 MG/ML
INJECTION, EMULSION INTRAVENOUS CONTINUOUS PRN
Status: DISCONTINUED | OUTPATIENT
Start: 2024-09-16 | End: 2024-09-16

## 2024-09-16 RX ORDER — FENTANYL CITRATE/PF 50 MCG/ML
25 SYRINGE (ML) INJECTION
Status: DISCONTINUED | OUTPATIENT
Start: 2024-09-16 | End: 2024-09-16 | Stop reason: HOSPADM

## 2024-09-16 RX ORDER — TRAMADOL HYDROCHLORIDE 50 MG/1
50 TABLET ORAL EVERY 6 HOURS PRN
Status: DISCONTINUED | OUTPATIENT
Start: 2024-09-16 | End: 2024-09-16 | Stop reason: HOSPADM

## 2024-09-16 RX ORDER — CEFAZOLIN SODIUM 1 G/3ML
INJECTION, POWDER, FOR SOLUTION INTRAMUSCULAR; INTRAVENOUS AS NEEDED
Status: DISCONTINUED | OUTPATIENT
Start: 2024-09-16 | End: 2024-09-16

## 2024-09-16 RX ORDER — KETOROLAC TROMETHAMINE 30 MG/ML
INJECTION, SOLUTION INTRAMUSCULAR; INTRAVENOUS AS NEEDED
Status: DISCONTINUED | OUTPATIENT
Start: 2024-09-16 | End: 2024-09-16

## 2024-09-16 RX ADMIN — MIDAZOLAM 2 MG: 1 INJECTION INTRAMUSCULAR; INTRAVENOUS at 11:54

## 2024-09-16 RX ADMIN — KETOROLAC TROMETHAMINE 15 MG: 30 INJECTION, SOLUTION INTRAMUSCULAR; INTRAVENOUS at 12:21

## 2024-09-16 RX ADMIN — CHLORHEXIDINE GLUCONATE 15 ML: 1.2 RINSE ORAL at 11:38

## 2024-09-16 RX ADMIN — FENTANYL CITRATE 50 MCG: 50 INJECTION INTRAMUSCULAR; INTRAVENOUS at 11:56

## 2024-09-16 RX ADMIN — PROPOFOL 100 MCG/KG/MIN: 10 INJECTION, EMULSION INTRAVENOUS at 11:57

## 2024-09-16 RX ADMIN — PROPOFOL 30 MG: 10 INJECTION, EMULSION INTRAVENOUS at 11:56

## 2024-09-16 RX ADMIN — FENTANYL CITRATE 25 MCG: 50 INJECTION INTRAMUSCULAR; INTRAVENOUS at 12:04

## 2024-09-16 RX ADMIN — SODIUM CHLORIDE, SODIUM LACTATE, POTASSIUM CHLORIDE, AND CALCIUM CHLORIDE: .6; .31; .03; .02 INJECTION, SOLUTION INTRAVENOUS at 11:43

## 2024-09-16 RX ADMIN — TRAMADOL HYDROCHLORIDE 50 MG: 50 TABLET, COATED ORAL at 13:09

## 2024-09-16 RX ADMIN — ONDANSETRON 4 MG: 2 INJECTION INTRAMUSCULAR; INTRAVENOUS at 12:14

## 2024-09-16 RX ADMIN — CEFAZOLIN 2000 MG: 1 INJECTION, POWDER, FOR SOLUTION INTRAMUSCULAR; INTRAVENOUS at 12:00

## 2024-09-16 RX ADMIN — FENTANYL CITRATE 25 MCG: 50 INJECTION INTRAMUSCULAR; INTRAVENOUS at 12:13

## 2024-09-16 NOTE — ANESTHESIA POSTPROCEDURE EVALUATION
Post-Op Assessment Note    CV Status:  Stable  Pain Score: 0    Pain management: adequate       Mental Status:  Arousable   Hydration Status:  Stable   PONV Controlled:  None   Airway Patency:  Patent     Post Op Vitals Reviewed: Yes    No anethesia notable event occurred.                BP   123/73   Temp   98.3   Pulse  60   Resp   16   SpO2   99%

## 2024-09-16 NOTE — ANESTHESIA PREPROCEDURE EVALUATION
Procedure:  RELEASE CARPAL TUNNEL (Left: Wrist)    Relevant Problems   ANESTHESIA (within normal limits)      CARDIO   (+) Bicuspid aortic valve   (+) Hyperlipidemia   (+) Hypertension   (+) Pure hypercholesterolemia      ENDO   (+) Type 2 diabetes mellitus with hyperglycemia (HCC) (Last Ozempic 9/7, Jardiance 9/12)      MUSCULOSKELETAL   (+) Arthritis of right knee      PULMONARY   (+) Mild intermittent asthma without complication   (+) Obstructive sleep apnea (Compliant with CPAP)      TTE (2022):    Left Ventricle: Left ventricular cavity size is normal. Wall thickness is normal. The left ventricular ejection fraction is 70%. Systolic function is vigorous. Although no diagnostic regional wall motion abnormality was identified, this possibility cannot be completely excluded on the basis of this study. Diastolic function is mildly abnormal, consistent with grade I (abnormal) relaxation.  Left atrial filling pressure is normal. E/E' 7.    Aortic Valve: The aortic valve was not well visualized. The leaflets are moderately thickened.Cannot confirm leaflet number. Per history bicuspid aortic valve s/p BAV. There is no evidence of regurgitation. There is mild stenosis. The aortic valve peak velocity is 2.41 m/s. The aortic valve mean gradient is 14 mmHg.    Mitral Valve: There is trace regurgitation.    Aorta: The aortic root is normal in size. The ascending aorta is moderately dilated. The aortic root is 3.50 cm. The ascending aorta is 4.5 cm.    No prior study available for comparison    Physical Exam    Airway    Mallampati score: II  TM Distance: >3 FB  Neck ROM: full     Dental   No notable dental hx     Cardiovascular  Cardiovascular exam normal    Pulmonary  Pulmonary exam normal     Other Findings        Anesthesia Plan  ASA Score- 2     Anesthesia Type- IV sedation with anesthesia with ASA Monitors.         Additional Monitors:     Airway Plan:            Plan Factors-Exercise tolerance (METS): >4  METS.    Chart reviewed. EKG reviewed. Imaging results reviewed. Existing labs reviewed. Patient summary reviewed.    Patient is not a current smoker.      Obstructive sleep apnea risk education given perioperatively.        Induction- intravenous.    Postoperative Plan-         Informed Consent- Anesthetic plan and risks discussed with patient.  I personally reviewed this patient with the CRNA. Discussed and agreed on the Anesthesia Plan with the CRNA..

## 2024-09-16 NOTE — H&P
H&P - Orthopedics   Name: Be Berry 54 y.o. male I MRN: [id number]  Unit/Bed#:  I Date of Admission: (Not on file)   Date of Service: 9/16/2024 I Hospital Day: 0     Assessment & Plan  Carpal tunnel syndrome of left wrist    Elective left carpal tunnel release  History of Present Illness   HPI: Be Berry is a 54 y.o. year old male who presented to our office complaining of left hand numbness and tingling.  EMGs of the patient's left wrist were performed which are consistent with carpal tunnel syndrome.  The patient stated that his symptoms are continuing to worsen and starting to affect his quality of life.  After exhausting conservative treatment, the risks and benefits of surgery were discussed with the patient.  He decided on an elective left carpal tunnel release.    Review of Systems   Constitutional:  Negative for chills, fever and unexpected weight change.   HENT:  Negative for hearing loss, nosebleeds and sore throat.    Eyes:  Negative for pain, redness and visual disturbance.   Respiratory:  Negative for cough, shortness of breath and wheezing.    Cardiovascular:  Negative for chest pain, palpitations and leg swelling.   Gastrointestinal:  Negative for abdominal pain, nausea and vomiting.   Endocrine: Negative for polydipsia and polyuria.   Genitourinary:  Negative for dysuria and hematuria.   Musculoskeletal:  Positive for arthralgias and myalgias.        As noted in HPI   Skin:  Negative for rash and wound.   Neurological:  Negative for dizziness and headaches.   Psychiatric/Behavioral:  Negative for decreased concentration and suicidal ideas. The patient is not nervous/anxious.      I have reviewed the patient's PMH, PSH, Social History, Family History, Meds, and Allergies    Objective                    I/O       None          Lines/Drains/Airways       Active Status       None                  Physical Exam   Left upper extremity is neurovascularly intact  Fingers are pink and  "mobile  Compartments are soft  Positive Tinel's  Positive Phalen's  2-point discrimination is markedly elongated along median nerve distribution  Brisk cap refill  Cardiovascular: Normal rate    Pulmonary: Effort normal  Abdomen: Soft, nontender, nondistended     Lab Results: I have reviewed the following results:    No results for input(s): \"WBC\", \"HGB\", \"HCT\", \"PLT\", \"BANDSPCT\", \"BUN\", \"CREATININE\", \"PTT\", \"INR\", \"ESR\", \"CRP\" in the last 72 hours.  Blood Culture: No results found for: \"BLOODCX\"  Wound Culture: No results found for: \"WOUNDCULT\"  "

## 2024-09-16 NOTE — DISCHARGE INSTR - AVS FIRST PAGE
POST-OPERATIVE INFORMATION  HAND SURGERY    Keep your hand elevated above heart level as much as possible during the first 48 hours after surgery to minimize swelling.   Exercise your fingers and elbow regularly by moving them in all directions. Try to make gentle fists or touch each finger to your thumb.  Do not remove the splint until you are seen in our office for follow up.  If the bandage begins to feel to tight, you may unwrap it and loosen it.  You should do this if you notice significant hand swelling.  You may change the dressing after 2 days or if it gets wet or dirty  You may shower after 2 days, but keep your hand and dressing dry.   Call our office if you experience pain not controlled by your medicine, develop a fever, experience increased drainage or redness around the incision site.  If a post-op follow up appointment is not arranged before you leave the hospital, call our office the day following surgery to schedule this appointment.  You should start therapy in about 2 days after surgery, or as previously scheduled.

## 2024-09-16 NOTE — OP NOTE
OPERATIVE REPORT  PATIENT NAME: Be Berry    :  1970  MRN: [id number]  Pt Location: [facility] OR ROOM 02    SURGERY DATE: 2024    Surgeons and Role:     * Hussain Menezes DO - Primary    Assistant: Demarco Edwards PA-C    Preop Diagnosis:  Carpal tunnel syndrome on left [G56.02]    Post-Op Diagnosis Codes:     * Carpal tunnel syndrome on left [G56.02]    Procedure(s):  Left - RELEASE CARPAL TUNNEL  Subcutaneous fasciotomy  Synovial biopsy  Application of volar wrist splint    Specimen(s):  Tenosynovium    Estimated Blood Loss:   Minimal    Drains:  None    Anesthesia Type:   Local with IV sedation    Operative Indications:  Carpal tunnel syndrome on left [G56.02]      Operative Findings:  TT: 15      Complications:   None    Procedure and Technique:      The patient was properly identified and brought to the operative suite.  He was given a dose of intravenous antibiotics.  After successful induction of the IV sedation a tourniquet was placed on patient's left proximal arm.   The left upper extremity was then prepped and draped in usual sterile fashion.        It was medically necessary that the physician assistant in the room that position and placed the appropriate amount retraction the patient.  The surgical landmarks were outlined with a  skin marker.  An Esmarch was used to exansuate the left upper extremity.  The tourniquet was then inflated. 1% lidocaine was used for local infiltration..  Using a #15.  Scalpel blade, a longitudinal   incision was made in the patient's left palm starting at the wrist crease extending distally for approximately 6-7 cm.  Hemostasis was achieved with the use of electrocautery.  This layer was brought down to the  subcutaneous fat layer.  Through further blunt dissection the palmar fascia layer was then located.  Using 2nd #15.  Scalpel blade the palmar fascia was then divided line with its fibers. The hypertrophic transverse carpal ligament then located.  It is  volar fibers were incised with a #15.  Scalpel blade.  As its terminal fibers were being  reached, a hemostat  was placed underneath this and  above the median nerve.  The remaining fibers of the transcarpal ligament ligament was then divided distally to the level of palmar arch and proximally to the level of the wrist crease.  There was still some stenosis proximally and remaining fibers of the transverse carpal ligament were then  divided. A small subcutaneous fasciotomy did occur.  Median nerve was inspected.   The nerve was quite compressed and had a bluish tinge indicative of chronic compression as well.  There was a tremendous amount of  tenosynovium present, and  a small portion was sent for biopsy purposes. The wound was then irrigated and closed with 4-0 nylon and dressed with ointment Xeroform 4 x 4 sterile a volar splint and Ace bandage    There was no complications from the procedure.  He was  Transferred to his  hospital bed and went to the PACU in stable condition.       I was present for the entire procedure., A qualified resident physician was not available., and A physician assistant was required during the procedure for retraction, tissue handling, dissection and suturing.    Patient Disposition:  PACU              SIGNATURE: Hussain Menezes,   DATE: September 16, 2024  TIME: 11:42 AM

## 2024-09-17 ENCOUNTER — EVALUATION (OUTPATIENT)
Facility: CLINIC | Age: 54
End: 2024-09-17
Payer: COMMERCIAL

## 2024-09-17 DIAGNOSIS — G56.02 CARPAL TUNNEL SYNDROME ON LEFT: Primary | ICD-10-CM

## 2024-09-17 PROCEDURE — 97110 THERAPEUTIC EXERCISES: CPT | Performed by: PHYSICAL THERAPIST

## 2024-09-17 PROCEDURE — 97535 SELF CARE MNGMENT TRAINING: CPT | Performed by: PHYSICAL THERAPIST

## 2024-09-17 PROCEDURE — 97112 NEUROMUSCULAR REEDUCATION: CPT | Performed by: PHYSICAL THERAPIST

## 2024-09-17 PROCEDURE — 97161 PT EVAL LOW COMPLEX 20 MIN: CPT | Performed by: PHYSICAL THERAPIST

## 2024-09-17 PROCEDURE — 97140 MANUAL THERAPY 1/> REGIONS: CPT | Performed by: PHYSICAL THERAPIST

## 2024-09-19 ENCOUNTER — OFFICE VISIT (OUTPATIENT)
Facility: CLINIC | Age: 54
End: 2024-09-19
Payer: COMMERCIAL

## 2024-09-19 DIAGNOSIS — G56.02 CARPAL TUNNEL SYNDROME ON LEFT: Primary | ICD-10-CM

## 2024-09-19 PROCEDURE — 97112 NEUROMUSCULAR REEDUCATION: CPT

## 2024-09-19 PROCEDURE — 97110 THERAPEUTIC EXERCISES: CPT

## 2024-09-19 PROCEDURE — 97140 MANUAL THERAPY 1/> REGIONS: CPT

## 2024-09-19 NOTE — PROGRESS NOTES
Daily Note     Today's date: 2024  Patient name: Be Berry  : 1970  MRN: [id number]  Referring provider: Hussain Menezes DO  Dx:   Encounter Diagnosis     ICD-10-CM    1. Carpal tunnel syndrome on left  G56.02                      Subjective: Pt reports increased motion in the hand/wrist.      Objective: See treatment diary below    AROM FA S/P- 70/70; wrist E/F- 45/30;               Digits- MP- 15/55; PIP- 25/70; DIP- 0/15              Thumb MP- 0/15; IP- 0/50; opposition- -7.0 cm  Strength- un-assessed  Post-op dressings removed- sutures present, no bleeding apparent  Circumference at wrist- 21.0 cm; MPs- 24.0 cm; Thumb P1- 8.8 cm; MF P1- 8.2 cm  Sensation- pt notes numbness persists IF/MF  Pt instructed in MNGE, AROM, elevation, wound care.    Provided with isotoner tg-soft for compression  Assessment: Tolerated treatment well. Patient would benefit from continued PT      Plan: Progress treatment as tolerated.       Precautions: avoid lifting, loading, heavy or repetitive grasp for 3-4 weeks after surgery      Manuals            STM 23 20           isotoner L Lg            Tg-soft Med                         Neuro Re-Ed             HP/pulsed biph 15 15                                                  Ther Ex             MNGE 5x 5x           AROM wrist  ----> 2x5                       thumb ----> 2x5                                                                                                                                                          Modalities             US ----> ------> ----->          CP 15 15

## 2024-09-23 PROCEDURE — 88313 SPECIAL STAINS GROUP 2: CPT | Performed by: PATHOLOGY

## 2024-09-23 PROCEDURE — 88304 TISSUE EXAM BY PATHOLOGIST: CPT | Performed by: PATHOLOGY

## 2024-09-24 ENCOUNTER — OFFICE VISIT (OUTPATIENT)
Facility: CLINIC | Age: 54
End: 2024-09-24
Payer: COMMERCIAL

## 2024-09-24 DIAGNOSIS — G56.02 CARPAL TUNNEL SYNDROME ON LEFT: Primary | ICD-10-CM

## 2024-09-24 PROCEDURE — 97112 NEUROMUSCULAR REEDUCATION: CPT

## 2024-09-24 PROCEDURE — 97110 THERAPEUTIC EXERCISES: CPT

## 2024-09-24 PROCEDURE — 97035 APP MDLTY 1+ULTRASOUND EA 15: CPT

## 2024-09-24 PROCEDURE — 97140 MANUAL THERAPY 1/> REGIONS: CPT

## 2024-09-24 NOTE — PROGRESS NOTES
Daily Note     Today's date: 2024  Patient name: Be Berry  : 1970  MRN: [id number]  Referring provider: Hussain Menezes DO  Dx:   Encounter Diagnosis     ICD-10-CM    1. Carpal tunnel syndrome on left  G56.02                      Subjective: Patient reports that he has some stiffness and swelling in his L wrist and hand. He has numbness in his index and middle fingers more after surgery. He RTW .      Objective: See treatment diary below.   AROM FA S/P- 70/70; wrist E/F- 45/30;               Digits- MP- 15/55; PIP- 25/70; DIP- 0/15              Thumb MP- 0/15; IP- 0/50; opposition- -7.0 cm  Strength- un-assessed  Post-op dressings removed- sutures present, no bleeding apparent  Circumference at wrist- 21.0 cm; MPs- 24.0 cm; Thumb P1- 8.8 cm; MF P1- 8.2 cm      Assessment: Tolerated treatment well. Patient would benefit from continued PT to manage symptoms, improve L hand  intrinsic/extrinsics, dexterity, and to perform functional activities for longer periods of time w/o an increase in pain. Will progress as able and to tolerance.       Plan: Continue per plan of care.      Precautions: avoid lifting, loading, heavy or repetitive grasp for 3-4 weeks after surgery      Manuals           STM 23 20 15          isotoner L Lg            Tg-soft Med                         Neuro Re-Ed             HP/pulsed biph 15 15 15                                                 Ther Ex             MNGE 5x 5x 5x          AROM wrist  ----> 2x5 2x5                      thumb ----> 2x5 2x5                                                                                                                                                         Modalities             US ----> ------> 12          CP 15 15 15

## 2024-09-26 ENCOUNTER — OFFICE VISIT (OUTPATIENT)
Facility: CLINIC | Age: 54
End: 2024-09-26
Payer: COMMERCIAL

## 2024-09-26 DIAGNOSIS — G56.02 CARPAL TUNNEL SYNDROME ON LEFT: Primary | ICD-10-CM

## 2024-09-26 PROCEDURE — 97110 THERAPEUTIC EXERCISES: CPT | Performed by: PHYSICAL THERAPIST

## 2024-09-26 PROCEDURE — 97035 APP MDLTY 1+ULTRASOUND EA 15: CPT | Performed by: PHYSICAL THERAPIST

## 2024-09-26 PROCEDURE — 97112 NEUROMUSCULAR REEDUCATION: CPT | Performed by: PHYSICAL THERAPIST

## 2024-09-26 PROCEDURE — 97140 MANUAL THERAPY 1/> REGIONS: CPT | Performed by: PHYSICAL THERAPIST

## 2024-09-26 NOTE — PROGRESS NOTES
PT Re-Evaluation     Today's date: 2024  Patient name: Be Berry  : 1970  MRN: [id number]  Referring provider: Hussain Menezes DO  Dx:   Encounter Diagnosis     ICD-10-CM    1. Carpal tunnel syndrome on left  G56.02                      Assessment    Assessment details: Pt is a 53 YO male presenting to PT with pain, decreased AROM, strength and tolerance to activity.  Pt would benefit from skilled intervention to address these issues and maximize overall function.  Occupation- [employer]- off for 2 weeks; RTW 24  Dominant-  right; Involved- left CT    Pt is showing progress with ability to use his hand for light/moderate activity. Sutures will be removed 10/3 and patient will progress with strengthening after wound completely closed.  Pt will RTW  with work modifications.      Goals  ST.  Decrease pain to 0-2/10 in 4-8 weeks to ease ADL and self care            2.  Decrease swelling 0.5 cm in 4-8 weeks            3.  Increase AROM 10-20 degrees in 4 weeks for improved ability to bathe, dress, and assist in functional activity            4.  Improve  strength by 5 lbs in 4 weeks            5.  Provide orthotic for protection, compression for support            6.  Instruct in activity modification for ADL and self care with independence in 4-6 weeks            7.  Instruct in HEP   LT.  Increase functional AROM to assist with independence in 8-12 weeks            2.  [place] with HEP in 4-6 weeks            3.  Increase  strength by 10 lbs in 8 weeks            4. Recreational activities are improved to maximum level in 12 weeks.            5.  ADL performance is improved to maximal level of function in 12 weeks.            6. Ability to RTW by DC        Plan  Patient would benefit from: skilled physical therapy  Planned modality interventions: cryotherapy, ultrasound, thermotherapy: hydrocollator packs and neuromuscular electric stimulation    Planned therapy  interventions: activity modification, manual therapy, neuromuscular re-education, strengthening, stretching, therapeutic activities, therapeutic exercise and home exercise program    Frequency: 2x week  Duration in weeks: 4  Treatment plan discussed with: patient      Subjective Evaluation    History of Present Illness  Date of surgery: 2024  Mechanism of injury: 2 year progression of numbness, tingling left T, IF, MF.  Left CT surgery 24  Patient Goals  Patient goals for therapy: decreased edema, decreased pain, increased motion, increased strength, independence with ADLs/IADLs, return to sport/leisure activities and return to work    Pain  Current pain ratin  At best pain ratin  At worst pain ratin  Location: IF, MF numbness  Quality: dull ache    Hand dominance: right    Treatments  Current treatment: physical therapy      Objective     General Comments:      Wrist/Hand Comments  AROM FA S/P- 70/70; wrist E/F- 65/45               Digits- MP- 0/75; PIP- 0/85 DIP- 0/45              Thumb MP- 0/25; IP- 0/85; opposition- -4.0 cm  Strength- un-assessed  Inspection- sutures clean and dry  Circumference at wrist- 20.0 cm; MPs- 23.2 cm; Thumb P1- 7.8 cm; MF P1- 7.5 cm  Sensation- pt notes numbness persists T/IF/MF- [place]- 4.31  Pt instructed in MNGE, AROM, elevation, wound care.    Provided with isotoner, tg-soft for compression               Precautions: avoid lifting, loading, heavy or repetitive grasp for 3-4 weeks after surgery    Manuals                STM 23 20 15  15               isotoner L Lg                     Tg-soft Med                                             Neuro Re-Ed                       HP/pulsed biph 15 15 15  15                                                                                       Ther Ex                       MNGE 5x 5x 5x  5x               AROM wrist  ----> 2x5 2x5  2/5                           thumb ----> 2x5 2x5  2/5                                                                                                                                                                                                                                                                                        Modalities                       US ----> ------> 12  12               CP 15 15 15  15

## 2024-10-01 ENCOUNTER — OFFICE VISIT (OUTPATIENT)
Facility: CLINIC | Age: 54
End: 2024-10-01
Payer: COMMERCIAL

## 2024-10-01 DIAGNOSIS — G56.02 CARPAL TUNNEL SYNDROME ON LEFT: Primary | ICD-10-CM

## 2024-10-01 PROCEDURE — 97112 NEUROMUSCULAR REEDUCATION: CPT

## 2024-10-01 PROCEDURE — 97110 THERAPEUTIC EXERCISES: CPT

## 2024-10-01 PROCEDURE — 97035 APP MDLTY 1+ULTRASOUND EA 15: CPT

## 2024-10-01 PROCEDURE — 97140 MANUAL THERAPY 1/> REGIONS: CPT

## 2024-10-01 NOTE — PROGRESS NOTES
"Daily Note     Today's date: 10/1/2024  Patient name: Be Berry  : 1970  MRN: [id number]  Referring provider: Hussain Menezes DO  Dx:   Encounter Diagnosis     ICD-10-CM    1. Carpal tunnel syndrome on left  G56.02                      Subjective: Pt reports some tingling \"in my index and middle fingers but not in my thumb.\"      Objective: See treatment diary below    AROM FA S/P- 70/70; wrist E/F- 65/45               Digits- MP- 0/75; PIP- 0/85 DIP- 0/45              Thumb MP- 0/25; IP- 0/85; opposition- -4.0 cm  Strength- un-assessed  Inspection- sutures clean and dry  Circumference at wrist- 20.0 cm; MPs- 23.2 cm; Thumb P1- 7.8 cm; MF P1- 7.5 cm  Sensation- pt notes numbness persists T/IF/MF- [place]- 4.31  Pt instructed in MNGE, AROM, elevation, wound care.    Provided with isotoner, tg-soft for compression      Assessment: Tolerated treatment well. Patient would benefit from continued PT. Pt RTW and some soreness. Pt compliant with HEP and has appt Thursday.      Plan: Progress treatment as tolerated.       Precautions: avoid lifting, loading, heavy or repetitive grasp for 3-4 weeks after surgery    Manuals 9/17 9/19 9/24  9/26  10/1             [facility] 23 20 15  15  15             isotoner L Lg                     Tg-soft Med                                             Neuro Re-Ed                       HP/pulsed biph 15 15 15  15  15                                                                                     Ther Ex                       MNGE 5x 5x 5x  5x  5x             AROM wrist  ----> 2x5 2x5  2/5  2/5                         thumb ----> 2x5 2x5  2/5  2/5                                                                                                                                                                                                                                                                                     Modalities                       US ----> ------> 12  12 "  12             CP 15 15 15  15  15

## 2024-10-03 ENCOUNTER — OFFICE VISIT (OUTPATIENT)
Facility: CLINIC | Age: 54
End: 2024-10-03
Payer: COMMERCIAL

## 2024-10-03 ENCOUNTER — OFFICE VISIT (OUTPATIENT)
Dept: OBGYN CLINIC | Facility: CLINIC | Age: 54
End: 2024-10-03

## 2024-10-03 VITALS
TEMPERATURE: 98.7 F | BODY MASS INDEX: 32.88 KG/M2 | DIASTOLIC BLOOD PRESSURE: 75 MMHG | HEART RATE: 67 BPM | WEIGHT: 222 LBS | HEIGHT: 69 IN | SYSTOLIC BLOOD PRESSURE: 116 MMHG | OXYGEN SATURATION: 98 %

## 2024-10-03 DIAGNOSIS — G56.02 CARPAL TUNNEL SYNDROME ON LEFT: Primary | ICD-10-CM

## 2024-10-03 DIAGNOSIS — G56.02 CARPAL TUNNEL SYNDROME ON LEFT: ICD-10-CM

## 2024-10-03 DIAGNOSIS — Z98.890 S/P CARPAL TUNNEL RELEASE: Primary | ICD-10-CM

## 2024-10-03 PROCEDURE — 97535 SELF CARE MNGMENT TRAINING: CPT | Performed by: PHYSICAL THERAPIST

## 2024-10-03 PROCEDURE — 97035 APP MDLTY 1+ULTRASOUND EA 15: CPT | Performed by: PHYSICAL THERAPIST

## 2024-10-03 PROCEDURE — 97140 MANUAL THERAPY 1/> REGIONS: CPT | Performed by: PHYSICAL THERAPIST

## 2024-10-03 PROCEDURE — 97112 NEUROMUSCULAR REEDUCATION: CPT | Performed by: PHYSICAL THERAPIST

## 2024-10-03 PROCEDURE — 97110 THERAPEUTIC EXERCISES: CPT | Performed by: PHYSICAL THERAPIST

## 2024-10-03 PROCEDURE — 99024 POSTOP FOLLOW-UP VISIT: CPT | Performed by: ORTHOPAEDIC SURGERY

## 2024-10-03 NOTE — PROGRESS NOTES
Daily Note     Today's date: 10/3/2024  Patient name: Be Berry  : 1970  MRN: [id number]  Referring provider: Hussain Menezes DO  Dx:   Encounter Diagnosis     ICD-10-CM    1. Carpal tunnel syndrome on left  G56.02                      Subjective: pr  had sutures removed and is feeling more wrist and thumb mobility      Objective: See treatment diary below    Pt issued isotoner for swelling  AROM FA S/P- 70/70; wrist E/F- 65/45               Digits- MP- 0/75; PIP- 0/85 DIP- 0/45              Thumb MP- 0/25; IP- 0/85; opposition- -4.0 cm  Strength- un-assessed  Inspection- sutures removed- steri strips in place  Circumference at wrist- 20.0 cm; MPs- 23.2 cm; Thumb P1- 7.8 cm; MF P1- 7.5 cm  Sensation- pt notes numbness persists T/IF/MF- [place]- 4.31  Pt instructed in MNGE, AROM, elevation, wound care.      Assessment: Tolerated treatment well. Patient would benefit from continued PT      Plan: Continue per plan of care.      Precautions: avoid lifting, loading, heavy or repetitive grasp for 3-4 weeks after surgery    Manuals 9/17 9/19 9/24  9/26  10/1  10/3           [facility] 23 20 15  15  15  15           isotoner L Lg                     Tg-soft Med                                             Neuro Re-Ed                       HP/pulsed biph 15 15 15  15  15  15                                                                                   Ther Ex                       MNGE 5x 5x 5x  5x  5x  5x           AROM wrist  ----> 2x5 2x5  2/5  2/5  2/5                       thumb ----> 2x5 2x5  2/5  2/5  2/5                                                                                                                                                                                                                                                                                   Modalities                       US ----> ------> 12  12  12  12           CP 15 15 15  15  15  15

## 2024-10-03 NOTE — PROGRESS NOTES
Assessment/Plan:   Diagnoses and all orders for this visit:    S/P carpal tunnel release    Carpal tunnel syndrome on left       Patient is doing well post-operatively. He is 2 weeks s/p carpal tunnel release. 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 His sutures were removed at today's visit. He will follow up in 1 month.     The patient is doing well from his carpal tunnel release.  Range of motion and sensation is improving.  Continue therapy.  Follow-up 1 month for strength and motion check      Subjective:   Patient ID: Be Berry  1970     HPI  Patient is a 54 y.o. male who presents for post op evaluation following carpal tunnel release, DOS 9/16/2024. He is doing well post-operatively. He notes there is still some numbness in his index and long finger. He is back to work and modifying duties.     The following portions of the patient's history were reviewed and updated as appropriate:  Past medical history, past surgical history, Family history, social history, current medications and allergies    Past Medical History:   Diagnosis Date    Asthma 01/17/2013    Last assessed    CPAP (continuous positive airway pressure) dependence     De Quervain's tenosynovitis, right 03/23/2017    Diabetes mellitus (HCC)     type 2    Hyperlipidemia     Hypertension     Sleep apnea        Past Surgical History:   Procedure Laterality Date    CLAVICLE SURGERY      INGUINAL HERNIA REPAIR Left     ORCHIECTOMY Left     NV INCISION EXTENSOR TENDON SHEATH WRIST Right 3/23/2017    Procedure: RELEASE DEQUERVAINS;  Surgeon: Freedom Bajwa MD;  Location: [facility];  Service: Orthopedics    NV NEUROPLASTY &/TRANSPOS MEDIAN NRV CARPAL TUNNE Left 9/16/2024    Procedure: RELEASE CARPAL TUNNEL;  Surgeon: Hussain Menezes DO;  Location: [facility];  Service: Orthopedics    ROTATOR CUFF REPAIR Right        Family History   Problem Relation Age of Onset    Asthma Mother     Diabetes Mother     Atrial fibrillation  Father        Social History     Socioeconomic History    Marital status: /Civil Union     Spouse name: None    Number of children: None    Years of education: None    Highest education level: None   Occupational History    None   Tobacco Use    Smoking status: Never    Smokeless tobacco: Never   Vaping Use    Vaping status: Never Used   Substance and Sexual Activity    Alcohol use: Yes     Comment: social    Drug use: No    Sexual activity: None   Other Topics Concern    None   Social History Narrative    None     Social Determinants of Health     Financial Resource Strain: Not on file   Food Insecurity: Not on file   Transportation Needs: Not on file   Physical Activity: Not on file   Stress: Not on file   Social Connections: Unknown (6/18/2024)    Received from [employer]     How often do you feel lonely or isolated from those around you? (Adult - for ages 18 years and over): Not on file   Intimate Partner Violence: Not on file   Housing Stability: Not on file         Current Outpatient Medications:     Blood Glucose Monitoring Suppl (OneTouch Verio Reflect) w/Device KIT, Check blood sugars once daily. Please substitute with appropriate alternative as covered by patient's insurance. Dx: E11.65, Disp: 1 kit, Rfl: 0    glipiZIDE-metFORMIN (METAGLIP) 2.5-500 MG per tablet, TAKE 2 TABLETS TWICE A DAY, Disp: 360 tablet, Rfl: 3    Glucosamine-Chondroitin (GLUCOSAMINE CHONDR COMPLEX PO), Take by mouth, Disp: , Rfl:     glucose blood (FREESTYLE LITE) test strip, Test daily., Disp: 100 each, Rfl: 5    glucose blood (OneTouch Verio) test strip, Check blood sugars once daily. Please substitute with appropriate alternative as covered by patient's insurance. Dx: E11.65, Disp: 100 each, Rfl: 0    Jardiance 10 MG TABS tablet, TAKE 1 TABLET DAILY, Disp: 90 tablet, Rfl: 3    meclizine (ANTIVERT) 25 mg tablet, Take 1 tablet (25 mg total) by mouth 3 (three) times a day as needed for dizziness, Disp: 30  "tablet, Rfl: 0    metoprolol succinate (TOPROL-XL) 50 mg 24 hr tablet, TAKE 1 TABLET DAILY, Disp: 90 tablet, Rfl: 3    olmesartan-hydrochlorothiazide (BENICAR HCT) 40-25 MG per tablet, TAKE 1 TABLET DAILY, Disp: 90 tablet, Rfl: 3    OneTouch Delica Lancets 33G MISC, Check blood sugars once daily. Please substitute with appropriate alternative as covered by patient's insurance. Dx: E11.65, Disp: 100 each, Rfl: 3    Ozempic, 2 MG/DOSE, 8 MG/3ML injection pen, INJECT 2 MG UNDER THE SKIN EVERY 7 DAYS, Disp: 9 mL, Rfl: 3    simvastatin (ZOCOR) 20 mg tablet, TAKE 1 TABLET DAILY IN THE EVENING (Patient taking differently: in the morning), Disp: 90 tablet, Rfl: 3    naproxen (Naprosyn) 500 mg tablet, Take 1 tablet (500 mg total) by mouth 2 (two) times a day with meals for 14 days, Disp: 28 tablet, Rfl: 0    scopolamine (TRANSDERM-SCOP) 1 mg/3 days TD 72 hr patch, Place 1 patch on the skin over 72 hours every third day, Disp: 4 patch, Rfl: 5    No Known Allergies    Review of Systems   Constitutional:  Negative for chills and fever.   HENT:  Negative for ear pain and sore throat.    Eyes:  Negative for pain and visual disturbance.   Respiratory:  Negative for cough and shortness of breath.    Cardiovascular:  Negative for chest pain and palpitations.   Gastrointestinal:  Negative for abdominal pain and vomiting.   Genitourinary:  Negative for dysuria and hematuria.   Musculoskeletal:  Negative for arthralgias and back pain.   Skin:  Negative for color change and rash.   Neurological:  Negative for seizures and syncope.   All other systems reviewed and are negative.       Objective:  /75   Pulse 67   Temp 98.7 °F (37.1 °C)   Ht 5' 9\" (1.753 m)   Wt 101 kg (222 lb)   SpO2 98%   BMI 32.78 kg/m²     Ortho Exam  left wrist -  Patient presents with no obvious anatomical deformity  Skin is warm and dry to touch with no signs of erythema, ecchymosis, infection  TTP over incision site  Mild generalized soft tissue " swelling or effusion noted  Full FDS, FDP, extensor mechanisms are intact  Demonstrates normal wrist, elbow, and shoulder motion  Forearm compartments are soft and supple  2+ Distal radial pulse with brisk capillary refill to the fingers  Radial, median, ulnar motor and sensory distribution intact  Sensation to light touch intact distally      Physical Exam  Vitals and nursing note reviewed.   Constitutional:       General: He is not in acute distress.     Appearance: He is well-developed.   HENT:      Head: Normocephalic and atraumatic.   Eyes:      Conjunctiva/sclera: Conjunctivae normal.   Cardiovascular:      Rate and Rhythm: Normal rate and regular rhythm.      Heart sounds: No murmur heard.  Pulmonary:      Effort: Pulmonary effort is normal. No respiratory distress.      Breath sounds: Normal breath sounds.   Abdominal:      Palpations: Abdomen is soft.      Tenderness: There is no abdominal tenderness.   Musculoskeletal:         General: No swelling.      Cervical back: Neck supple.   Skin:     General: Skin is warm and dry.      Capillary Refill: Capillary refill takes less than 2 seconds.   Neurological:      Mental Status: He is alert.   Psychiatric:         Mood and Affect: Mood normal.      Diagnostic Test Review:  No new images to review    Procedures   None performed at time of visit    Scribe Attestation      I,:  Amanda Peraza am acting as a scribe while in the presence of the attending physician.:       I,:  Hussain Menezes, DO personally performed the services described in this documentation    as scribed in my presence.:

## 2024-10-08 ENCOUNTER — OFFICE VISIT (OUTPATIENT)
Facility: CLINIC | Age: 54
End: 2024-10-08
Payer: COMMERCIAL

## 2024-10-08 DIAGNOSIS — G56.02 CARPAL TUNNEL SYNDROME ON LEFT: Primary | ICD-10-CM

## 2024-10-08 PROCEDURE — 97110 THERAPEUTIC EXERCISES: CPT | Performed by: PHYSICAL THERAPIST

## 2024-10-08 PROCEDURE — 97112 NEUROMUSCULAR REEDUCATION: CPT | Performed by: PHYSICAL THERAPIST

## 2024-10-08 PROCEDURE — 97140 MANUAL THERAPY 1/> REGIONS: CPT | Performed by: PHYSICAL THERAPIST

## 2024-10-08 PROCEDURE — 97035 APP MDLTY 1+ULTRASOUND EA 15: CPT | Performed by: PHYSICAL THERAPIST

## 2024-10-08 NOTE — PROGRESS NOTES
Daily Note     Today's date: 10/8/2024  Patient name: Be Berry  : 1970  MRN: [id number]  Referring provider: Hussain Menezes DO  Dx:   Encounter Diagnosis     ICD-10-CM    1. Carpal tunnel syndrome on left  G56.02                      Subjective: pt notes mild soreness, he has been using his hand for increased activity since having sutures out      Objective: See treatment diary below    Pt issued isotoner for swelling  AROM FA S/P- 70/70; wrist E/F- 65/45               Digits- MP- 0/75; PIP- 0/85 DIP- 0/45              Thumb MP- 0/25; IP- 0/85; opposition- -4.0 cm  Strength- un-assessed  Inspection- sutures removed- steri strips in place  Circumference at wrist- 20.0 cm; MPs- 23.2 cm; Thumb P1- 7.8 cm; MF P1- 7.5 cm  Sensation- pt notes numbness persists T/IF/MF- [place]- 4.31  Pt instructed in MNGE, AROM, elevation, wound care.      Assessment: Tolerated treatment well. Patient would benefit from continued PT      Plan: Continue per plan of care.      Precautions: avoid lifting, loading, heavy or repetitive grasp for 3-4 weeks after surgery    Manuals 9/17 9/19 9/24  9/26  10/1  10/3  10/8         [facility] 23 20 15  15  15  15  15         isotoner L Lg                     Tg-soft Med                                             Neuro Re-Ed                       HP/pulsed biph 15 15 15  15  15  15  15                                                                                 Ther Ex                       MNGE 5x 5x 5x  5x  5x  5x  5x         AROM wrist  ----> 2x5 2x5  2/5  2/5  2/5  2/5                     thumb ----> 2x5 2x5  2/5  2/5  2/5  2/5                                                                                                                                                                                                                                                                                 Modalities                       US ----> ------> 12  12  12  12  12         CP 15  15 15  15  15  15  def

## 2024-10-10 ENCOUNTER — OFFICE VISIT (OUTPATIENT)
Facility: CLINIC | Age: 54
End: 2024-10-10
Payer: COMMERCIAL

## 2024-10-10 DIAGNOSIS — G56.02 CARPAL TUNNEL SYNDROME ON LEFT: Primary | ICD-10-CM

## 2024-10-10 PROCEDURE — 97035 APP MDLTY 1+ULTRASOUND EA 15: CPT | Performed by: PHYSICAL THERAPIST

## 2024-10-10 PROCEDURE — 97110 THERAPEUTIC EXERCISES: CPT | Performed by: PHYSICAL THERAPIST

## 2024-10-10 PROCEDURE — 97112 NEUROMUSCULAR REEDUCATION: CPT | Performed by: PHYSICAL THERAPIST

## 2024-10-10 PROCEDURE — 97140 MANUAL THERAPY 1/> REGIONS: CPT | Performed by: PHYSICAL THERAPIST

## 2024-10-10 NOTE — PROGRESS NOTES
PT Discharge    Today's date: 10/10/2024  Patient name: Be Berry  : 1970  MRN: [id number]  Referring provider: Hussain Menezes DO  Dx:   Encounter Diagnosis     ICD-10-CM    1. Carpal tunnel syndrome on left  G56.02                      Assessment    Assessment details: Pt is a 55 YO male presenting to PT with pain, decreased AROM, strength and tolerance to activity.  Pt would benefit from skilled intervention to address these issues and maximize overall function.  Occupation- [employer]- off for 2 weeks; RTW 24  Dominant-  right; Involved- left CT    Pt is showing progress with ability to use his hand for light/moderate activity. Sutures will be removed 10/3 and patient will progress with strengthening after wound completely closed.  Pt will RTW  with work modifications.    Pt will transition to a HEP for additional strengthening    Goals  ST.  Decrease pain to 0-2/10 in 4-8 weeks to ease ADL and self care            2.  Decrease swelling 0.5 cm in 4-8 weeks            3.  Increase AROM 10-20 degrees in 4 weeks for improved ability to bathe, dress, and assist in functional activity            4.  Improve  strength by 5 lbs in 4 weeks            5.  Provide orthotic for protection, compression for support            6.  Instruct in activity modification for ADL and self care with independence in 4-6 weeks            7.  Instruct in HEP   LT.  Increase functional AROM to assist with independence in 8-12 weeks            2.  [place] with HEP in 4-6 weeks            3.  Increase  strength by 10 lbs in 8 weeks            4. Recreational activities are improved to maximum level in 12 weeks.            5.  ADL performance is improved to maximal level of function in 12 weeks.            6. Ability to RTW by DC    Goals met    Plan  Patient would benefit from: skilled physical therapy  Planned modality interventions: cryotherapy, ultrasound, thermotherapy: hydrocollator packs and  neuromuscular electric stimulation    Planned therapy interventions: activity modification, manual therapy, neuromuscular re-education, strengthening, stretching, therapeutic activities, therapeutic exercise and home exercise program    Frequency: 2x week  Duration in weeks: 4  Treatment plan discussed with: patient        Subjective Evaluation    History of Present Illness  Date of surgery: 2024  Mechanism of injury: 2 year progression of numbness, tingling left T, IF, MF.  Left CT surgery 24  Patient Goals  Patient goals for therapy: decreased edema, decreased pain, increased motion, increased strength, independence with ADLs/IADLs, return to sport/leisure activities and return to work    Pain  Current pain ratin  At best pain ratin  At worst pain ratin  Location: IF, MF numbness  Quality: dull ache    Hand dominance: right    Treatments  Current treatment: physical therapy        Objective     General Comments:      Wrist/Hand Comments  Pt issued isotoner for swelling  AROM FA S/P- 70/70; wrist E/F- 65/45               Digits- MP- 0/75; PIP- 0/85 DIP- 0/45              Thumb MP- 0/25; IP- 0/85; opposition- -4.0 cm  Strength-  II- 45/95; 3 AYANNA- ; Key-   Inspection- sutures removed- incision healing; dry but no open areas  Circumference at wrist- 19.3 cm; MPs- 22.8 cm; Thumb P1- 7.8 cm; MF P1- 7.5 cm  Sensation- pt notes numbness persists T/IF/MF- [place]- 4.31                     Precautions: avoid lifting, loading, heavy or repetitive grasp for 3-4 weeks after surgery    Manuals 9/17 9/19 9/24  9/26  10/1  10/3  10/8  10/10       [facility] 23 20 15  15  15  15  15  115       isotoner L Lg                     Tg-soft Med                                             Neuro Re-Ed                       HP/pulsed biph 15 15 15  15  15  15  15  15                                                                               Ther Ex                       MNGE 5x 5x 5x  5x  5x  5x  5x  5x        AROM wrist  ----> 2x5 2x5  2/5  2/5  2/5  2/5                     thumb ----> 2x5 2x5  2/5  2/5  2/5  2/5                                                                                                                                                                                                                                                                                 Modalities                       US ----> ------> 12  12  12  12  12  12       CP 15 15 15  15  15  15  def  def

## 2024-10-10 NOTE — PROGRESS NOTES
Daily Note     Today's date: 10/10/2024  Patient name: Be Berry  : 1970  MRN: [id number]  Referring provider: Hussain Menezes DO  Dx:   Encounter Diagnosis     ICD-10-CM    1. Carpal tunnel syndrome on left  G56.02                      Subjective: ***      Objective: See treatment diary below      Assessment: Tolerated treatment {Tolerated treatment :1648738584}. Patient {assessment:2449446820}      Plan: {PLAN:0001596764}     Precautions: avoid lifting, loading, heavy or repetitive grasp for 3-4 weeks after surgery    Manuals 9/17 9/19 9/24  9/26  10/1  10/3  10/8         [facility] 23 20 15  15  15  15  15         isotoner L Lg                     Tg-soft Med                                             Neuro Re-Ed                       HP/pulsed biph 15 15 15  15  15  15  15                                                                                 Ther Ex                       MNGE 5x 5x 5x  5x  5x  5x  5x         AROM wrist  ----> 2x5 2x5  2/5  2/5  2/5  2/5                     thumb ----> 2x5 2x5  2/5  2/5  2/5  2/5                                                                                                                                                                                                                                                                                 Modalities                       US ----> ------> 12  12  12  12  12         CP 15 15 15  15  15  15  def

## 2024-10-15 ENCOUNTER — APPOINTMENT (OUTPATIENT)
Facility: CLINIC | Age: 54
End: 2024-10-15
Payer: COMMERCIAL

## 2024-10-17 ENCOUNTER — APPOINTMENT (OUTPATIENT)
Facility: CLINIC | Age: 54
End: 2024-10-17
Payer: COMMERCIAL

## 2024-10-21 DIAGNOSIS — I10 ESSENTIAL HYPERTENSION: ICD-10-CM

## 2024-10-22 ENCOUNTER — APPOINTMENT (OUTPATIENT)
Facility: CLINIC | Age: 54
End: 2024-10-22
Payer: COMMERCIAL

## 2024-10-22 RX ORDER — OLMESARTAN MEDOXOMIL AND HYDROCHLOROTHIAZIDE 40/25 40; 25 MG/1; MG/1
TABLET ORAL
Qty: 90 TABLET | Refills: 3 | Status: SHIPPED | OUTPATIENT
Start: 2024-10-22

## 2024-10-24 ENCOUNTER — APPOINTMENT (OUTPATIENT)
Facility: CLINIC | Age: 54
End: 2024-10-24
Payer: COMMERCIAL

## 2024-10-29 ENCOUNTER — APPOINTMENT (OUTPATIENT)
Facility: CLINIC | Age: 54
End: 2024-10-29
Payer: COMMERCIAL

## 2024-10-31 ENCOUNTER — OFFICE VISIT (OUTPATIENT)
Dept: OBGYN CLINIC | Facility: CLINIC | Age: 54
End: 2024-10-31

## 2024-10-31 ENCOUNTER — APPOINTMENT (OUTPATIENT)
Facility: CLINIC | Age: 54
End: 2024-10-31
Payer: COMMERCIAL

## 2024-10-31 VITALS
HEIGHT: 69 IN | BODY MASS INDEX: 33.62 KG/M2 | HEART RATE: 71 BPM | OXYGEN SATURATION: 96 % | WEIGHT: 227 LBS | DIASTOLIC BLOOD PRESSURE: 69 MMHG | SYSTOLIC BLOOD PRESSURE: 108 MMHG | TEMPERATURE: 98 F

## 2024-10-31 DIAGNOSIS — Z98.890 S/P CARPAL TUNNEL RELEASE: Primary | ICD-10-CM

## 2024-10-31 PROCEDURE — 99024 POSTOP FOLLOW-UP VISIT: CPT | Performed by: ORTHOPAEDIC SURGERY

## 2024-10-31 NOTE — PROGRESS NOTES
Assessment/Plan:   Diagnoses and all orders for this visit:    S/P carpal tunnel release    Patient is progressing as expected postoperatively. At this time he is able to return ADLs without restriction. Discussed with patient that full recovery can take as long as 2 years. He can continue OTC NSAIDs/Tylenol as needed for pain and soreness. Discussed with patient that he may experience pillar pain for as much is 6 months postoperatively. At this time there is no need for scheduled follow-up. Should any questions or concerns arise he can contact the office. Patient expresses understanding's and agrees with the treatment plan.    The patient is doing better in regards to his right carpal tunnel release.  The majority of sensation is restored.  Minimal decrease sensation along the tip of the middle and index finger.  Continue home exercise program.  Follow-up as needed basis.  If his condition changes, he would not hesitate to let us know    Subjective:   Patient ID: Be Berry  1970     HPI  Patient is a 54 y.o. male who presents for follow-up evaluation 6 weeks s/p left carpal tunnel release procedure performed 9/16/2024. States he is progressing well. He continues to have mild numbness in the thumb and index finger. He reports only minimal pillar pain at this time. He is using Tylenol OTC to manage his symptoms with great success. Denies any recent fever, chills, headache, nausea, dizziness or malaise. Denies any recent bruising, swelling, mechanical symptoms, or feelings of instability.    The following portions of the patient's history were reviewed and updated as appropriate:  Past medical history, past surgical history, Family history, social history, current medications and allergies    Past Medical History:   Diagnosis Date    Asthma 01/17/2013    Last assessed    CPAP (continuous positive airway pressure) dependence     De Quervain's tenosynovitis, right 03/23/2017    Diabetes mellitus (HCC)      type 2    Hyperlipidemia     Hypertension     Sleep apnea        Past Surgical History:   Procedure Laterality Date    CLAVICLE SURGERY      INGUINAL HERNIA REPAIR Left     ORCHIECTOMY Left     OH INCISION EXTENSOR TENDON SHEATH WRIST Right 3/23/2017    Procedure: RELEASE DEQUERVAINS;  Surgeon: Freedom Bajwa MD;  Location: [facility];  Service: Orthopedics    OH NEUROPLASTY &/TRANSPOS MEDIAN NRV CARPAL TUNNE Left 9/16/2024    Procedure: RELEASE CARPAL TUNNEL;  Surgeon: Hussain Menezes DO;  Location: [facility];  Service: Orthopedics    ROTATOR CUFF REPAIR Right        Family History   Problem Relation Age of Onset    Asthma Mother     Diabetes Mother     Atrial fibrillation Father        Social History     Socioeconomic History    Marital status: /Civil Union     Spouse name: None    Number of children: None    Years of education: None    Highest education level: None   Occupational History    None   Tobacco Use    Smoking status: Never    Smokeless tobacco: Never   Vaping Use    Vaping status: Never Used   Substance and Sexual Activity    Alcohol use: Yes     Comment: social    Drug use: No    Sexual activity: None   Other Topics Concern    None   Social History Narrative    None     Social Determinants of Health     Financial Resource Strain: Not on file   Food Insecurity: Not on file   Transportation Needs: Not on file   Physical Activity: Not on file   Stress: Not on file   Social Connections: Unknown (6/18/2024)    Received from [employer]     How often do you feel lonely or isolated from those around you? (Adult - for ages 18 years and over): Not on file   Intimate Partner Violence: Not on file   Housing Stability: Not on file         Current Outpatient Medications:     Blood Glucose Monitoring Suppl (OneTouch Verio Reflect) w/Device KIT, Check blood sugars once daily. Please substitute with appropriate alternative as covered by patient's insurance. Dx: E11.65, Disp: 1 kit, Rfl:  0    glipiZIDE-metFORMIN (METAGLIP) 2.5-500 MG per tablet, TAKE 2 TABLETS TWICE A DAY, Disp: 360 tablet, Rfl: 3    Glucosamine-Chondroitin (GLUCOSAMINE CHONDR COMPLEX PO), Take by mouth, Disp: , Rfl:     glucose blood (FREESTYLE LITE) test strip, Test daily., Disp: 100 each, Rfl: 5    glucose blood (OneTouch Verio) test strip, Check blood sugars once daily. Please substitute with appropriate alternative as covered by patient's insurance. Dx: E11.65, Disp: 100 each, Rfl: 0    Jardiance 10 MG TABS tablet, TAKE 1 TABLET DAILY, Disp: 90 tablet, Rfl: 3    meclizine (ANTIVERT) 25 mg tablet, Take 1 tablet (25 mg total) by mouth 3 (three) times a day as needed for dizziness, Disp: 30 tablet, Rfl: 0    metoprolol succinate (TOPROL-XL) 50 mg 24 hr tablet, TAKE 1 TABLET DAILY, Disp: 90 tablet, Rfl: 3    olmesartan-hydrochlorothiazide (BENICAR HCT) 40-25 MG per tablet, TAKE 1 TABLET DAILY, Disp: 90 tablet, Rfl: 3    OneTouch Delica Lancets 33G MISC, Check blood sugars once daily. Please substitute with appropriate alternative as covered by patient's insurance. Dx: E11.65, Disp: 100 each, Rfl: 3    Ozempic, 2 MG/DOSE, 8 MG/3ML injection pen, INJECT 2 MG UNDER THE SKIN EVERY 7 DAYS, Disp: 9 mL, Rfl: 3    simvastatin (ZOCOR) 20 mg tablet, TAKE 1 TABLET DAILY IN THE EVENING (Patient taking differently: in the morning), Disp: 90 tablet, Rfl: 3    naproxen (Naprosyn) 500 mg tablet, Take 1 tablet (500 mg total) by mouth 2 (two) times a day with meals for 14 days, Disp: 28 tablet, Rfl: 0    scopolamine (TRANSDERM-SCOP) 1 mg/3 days TD 72 hr patch, Place 1 patch on the skin over 72 hours every third day, Disp: 4 patch, Rfl: 5    No Known Allergies    Review of Systems   Constitutional:  Negative for chills, fatigue and fever.   Gastrointestinal:  Negative for nausea.   Musculoskeletal:         As noted in HPI   Neurological:  Positive for numbness. Negative for dizziness and headaches.   All other systems reviewed and are negative.    "    Objective:  /69 (BP Location: Right arm, Patient Position: Sitting, Cuff Size: Large)   Pulse 71   Temp 98 °F (36.7 °C) (Temporal)   Ht 5' 9\" (1.753 m)   Wt 103 kg (227 lb)   SpO2 96%   BMI 33.52 kg/m²     Ortho Exam  Left hand -   Incision(s): Well-healed  Skin is warm and dry to touch with no signs of erythema, ecchymosis, infection  - soft tissue swelling  - effusion  ROM: Full composite fist  Strength: 5/5 closed fist , 5/5 key pinch   2+ distal radial pulse with brisk capillary refill to the fingers  Radial, median, and ulnar motor and sensory distributions intact  Sensation light touch intact distally    Physical Exam  Vitals and nursing note reviewed.   Constitutional:       General: He is not in acute distress.     Appearance: He is well-developed.   HENT:      Head: Normocephalic and atraumatic.   Eyes:      Conjunctiva/sclera: Conjunctivae normal.   Cardiovascular:      Rate and Rhythm: Normal rate and regular rhythm.      Heart sounds: No murmur heard.  Pulmonary:      Effort: Pulmonary effort is normal. No respiratory distress.      Breath sounds: Normal breath sounds.   Abdominal:      Palpations: Abdomen is soft.      Tenderness: There is no abdominal tenderness.   Musculoskeletal:         General: No swelling.      Cervical back: Neck supple.   Skin:     General: Skin is warm and dry.      Capillary Refill: Capillary refill takes less than 2 seconds.   Neurological:      Mental Status: He is alert.   Psychiatric:         Mood and Affect: Mood normal.          Diagnostic Test Review:  No new imaging reviewed this visit    Procedures   No procedures performed this visit    Scribe Attestation      I,:  Trevor Caban am acting as a scribe while in the presence of the attending physician.:       I,:  Hussain Menezes DO personally performed the services described in this documentation    as scribed in my presence.:              "

## 2024-11-14 DIAGNOSIS — E11.9 TYPE 2 DIABETES MELLITUS WITHOUT COMPLICATION, WITHOUT LONG-TERM CURRENT USE OF INSULIN (HCC): ICD-10-CM

## 2024-11-14 RX ORDER — GLIPIZIDE AND METFORMIN HCL 2.5; 5 MG/1; MG/1
2 TABLET, FILM COATED ORAL 2 TIMES DAILY
Qty: 360 TABLET | Refills: 0 | Status: SHIPPED | OUTPATIENT
Start: 2024-11-14

## 2024-12-06 ENCOUNTER — VBI (OUTPATIENT)
Dept: ADMINISTRATIVE | Facility: OTHER | Age: 54
End: 2024-12-06

## 2024-12-06 NOTE — TELEPHONE ENCOUNTER
12/06/24 11:49 AM     Chart reviewed for Diabetic Eye Exam was/were not submitted to the patient's insurance.     Kanwal Lopez MA   [facility]

## 2025-01-02 ENCOUNTER — LAB (OUTPATIENT)
Dept: LAB | Facility: CLINIC | Age: 55
End: 2025-01-02
Payer: COMMERCIAL

## 2025-01-02 DIAGNOSIS — E11.65 TYPE 2 DIABETES MELLITUS WITH HYPERGLYCEMIA, WITHOUT LONG-TERM CURRENT USE OF INSULIN (HCC): ICD-10-CM

## 2025-01-02 DIAGNOSIS — Z11.4 SCREENING FOR HIV WITHOUT PRESENCE OF RISK FACTORS: ICD-10-CM

## 2025-01-02 LAB
CREAT UR-MCNC: 79.5 MG/DL
EST. AVERAGE GLUCOSE BLD GHB EST-MCNC: 217 MG/DL
HBA1C MFR BLD: 9.2 %
MICROALBUMIN UR-MCNC: 9.3 MG/L
MICROALBUMIN/CREAT 24H UR: 12 MG/G CREATININE (ref 0–30)

## 2025-01-02 PROCEDURE — 82570 ASSAY OF URINE CREATININE: CPT

## 2025-01-02 PROCEDURE — 87389 HIV-1 AG W/HIV-1&-2 AB AG IA: CPT

## 2025-01-02 PROCEDURE — 36415 COLL VENOUS BLD VENIPUNCTURE: CPT

## 2025-01-02 PROCEDURE — 82043 UR ALBUMIN QUANTITATIVE: CPT

## 2025-01-02 PROCEDURE — 83036 HEMOGLOBIN GLYCOSYLATED A1C: CPT

## 2025-01-03 ENCOUNTER — OFFICE VISIT (OUTPATIENT)
Dept: FAMILY MEDICINE CLINIC | Facility: CLINIC | Age: 55
End: 2025-01-03
Payer: COMMERCIAL

## 2025-01-03 VITALS
DIASTOLIC BLOOD PRESSURE: 74 MMHG | HEIGHT: 69 IN | WEIGHT: 233.3 LBS | HEART RATE: 78 BPM | OXYGEN SATURATION: 97 % | SYSTOLIC BLOOD PRESSURE: 118 MMHG | RESPIRATION RATE: 18 BRPM | BODY MASS INDEX: 34.55 KG/M2

## 2025-01-03 DIAGNOSIS — E08.49 DIABETES DUE TO UNDRL CONDITION W OTH DIABETIC NEURO COMP (HCC): ICD-10-CM

## 2025-01-03 DIAGNOSIS — Z79.4 TYPE 2 DIABETES MELLITUS WITHOUT COMPLICATION, WITH LONG-TERM CURRENT USE OF INSULIN (HCC): Primary | ICD-10-CM

## 2025-01-03 DIAGNOSIS — J45.20 MILD INTERMITTENT ASTHMA WITHOUT COMPLICATION: ICD-10-CM

## 2025-01-03 DIAGNOSIS — Q23.1 CONGENITAL INSUFFICIENCY OF AORTIC VALVE: ICD-10-CM

## 2025-01-03 DIAGNOSIS — E11.65 TYPE 2 DIABETES MELLITUS WITH HYPERGLYCEMIA, WITHOUT LONG-TERM CURRENT USE OF INSULIN (HCC): ICD-10-CM

## 2025-01-03 DIAGNOSIS — E11.9 TYPE 2 DIABETES MELLITUS WITHOUT COMPLICATION, WITH LONG-TERM CURRENT USE OF INSULIN (HCC): Primary | ICD-10-CM

## 2025-01-03 LAB
HIV 1+2 AB+HIV1 P24 AG SERPL QL IA: NORMAL
HIV 2 AB SERPL QL IA: NORMAL
HIV1 AB SERPL QL IA: NORMAL
HIV1 P24 AG SERPL QL IA: NORMAL

## 2025-01-03 PROCEDURE — 99214 OFFICE O/P EST MOD 30 MIN: CPT | Performed by: FAMILY MEDICINE

## 2025-01-03 RX ORDER — ALBUTEROL SULFATE 90 UG/1
2 INHALANT RESPIRATORY (INHALATION) EVERY 6 HOURS PRN
Qty: 18 G | Refills: 3 | Status: SHIPPED | OUTPATIENT
Start: 2025-01-03

## 2025-01-03 NOTE — PROGRESS NOTES
"Name: Be Berry      : 1970      MRN: [id number]  Encounter Provider: Varghese Frost MD  Encounter Date: 1/3/2025   Encounter department: [facility]  :  Assessment & Plan  Type 2 diabetes mellitus without complication, with long-term current use of insulin (formerly [facility])    Lab Results   Component Value Date    HGBA1C 9.2 (H) 2025            Type 2 diabetes mellitus with hyperglycemia, without long-term current use of insulin (HCC)    Lab Results   Component Value Date    HGBA1C 9.2 (H) 2025                   History of Present Illness     He has T2DM.  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  He has no hypoglycemia and is on no medications that would put him at risk for low blood sugar.      He is compliant with his CPAP treatment.    His BP is well controlled and he has no CP or SOB.  He denies ARRIOLA or edema.  This should pose no risk for driving.       Review of Systems   All other systems reviewed and are negative.      Objective   /74 (BP Location: Left arm, Patient Position: Sitting, Cuff Size: Large)   Pulse 78   Resp 18   Ht 5' 9\" (1.753 m)   Wt 106 kg (233 lb 4.8 oz)   SpO2 97%   BMI 34.45 kg/m²      Physical Exam  Vitals and nursing note reviewed.   Constitutional:       Appearance: Normal appearance.   Cardiovascular:      Rate and Rhythm: Normal rate and regular rhythm.      Pulses: Normal pulses.      Heart sounds: Normal heart sounds.   Pulmonary:      Effort: Pulmonary effort is normal.      Breath sounds: Normal breath sounds.   Abdominal:      General: Abdomen is flat. Bowel sounds are normal.      Palpations: Abdomen is soft.   Musculoskeletal:         General: Normal range of motion.      Cervical back: Normal range of motion and neck supple.   Skin:     General: Skin is warm and dry.      Capillary Refill: Capillary refill takes less than 2 seconds.   Neurological:      General: No focal deficit " present.      Mental Status: He is alert and oriented to person, place, and time. Mental status is at baseline.   Psychiatric:         Mood and Affect: Mood normal.         Behavior: Behavior normal.         Thought Content: Thought content normal.         Judgment: Judgment normal.

## 2025-02-04 ENCOUNTER — TELEPHONE (OUTPATIENT)
Dept: FAMILY MEDICINE CLINIC | Facility: CLINIC | Age: 55
End: 2025-02-04

## 2025-02-04 DIAGNOSIS — R69 SICK: Primary | ICD-10-CM

## 2025-02-04 RX ORDER — AMOXICILLIN 500 MG/1
500 CAPSULE ORAL EVERY 8 HOURS SCHEDULED
Qty: 21 CAPSULE | Refills: 0 | Status: SHIPPED | OUTPATIENT
Start: 2025-02-04 | End: 2025-02-11

## 2025-02-12 DIAGNOSIS — E11.9 TYPE 2 DIABETES MELLITUS WITHOUT COMPLICATION, WITHOUT LONG-TERM CURRENT USE OF INSULIN (HCC): ICD-10-CM

## 2025-02-12 RX ORDER — GLIPIZIDE AND METFORMIN HCL 2.5; 5 MG/1; MG/1
2 TABLET, FILM COATED ORAL 2 TIMES DAILY
Qty: 360 TABLET | Refills: 3 | Status: SHIPPED | OUTPATIENT
Start: 2025-02-12

## 2025-02-27 DIAGNOSIS — E11.65 TYPE 2 DIABETES MELLITUS WITH HYPERGLYCEMIA, WITHOUT LONG-TERM CURRENT USE OF INSULIN (HCC): ICD-10-CM

## 2025-02-27 RX ORDER — SEMAGLUTIDE 2.68 MG/ML
INJECTION, SOLUTION SUBCUTANEOUS
Qty: 9 ML | Refills: 3 | Status: SHIPPED | OUTPATIENT
Start: 2025-02-27

## 2025-03-28 DIAGNOSIS — J30.0 VASOMOTOR RHINITIS: Primary | ICD-10-CM

## 2025-03-28 RX ORDER — IPRATROPIUM BROMIDE 21 UG/1
2 SPRAY, METERED NASAL EVERY 12 HOURS
Qty: 30 ML | Refills: 5 | Status: SHIPPED | OUTPATIENT
Start: 2025-03-28

## 2025-04-03 DIAGNOSIS — I10 ESSENTIAL HYPERTENSION: ICD-10-CM

## 2025-04-03 RX ORDER — SIMVASTATIN 20 MG
20 TABLET ORAL EVERY EVENING
Qty: 90 TABLET | Refills: 3 | Status: SHIPPED | OUTPATIENT
Start: 2025-04-03

## 2025-04-14 DIAGNOSIS — I10 ESSENTIAL HYPERTENSION: ICD-10-CM

## 2025-04-14 RX ORDER — METOPROLOL SUCCINATE 50 MG/1
50 TABLET, EXTENDED RELEASE ORAL DAILY
Qty: 90 TABLET | Refills: 3 | Status: SHIPPED | OUTPATIENT
Start: 2025-04-14

## 2025-05-19 DIAGNOSIS — E11.65 TYPE 2 DIABETES MELLITUS WITH HYPERGLYCEMIA, WITHOUT LONG-TERM CURRENT USE OF INSULIN (HCC): ICD-10-CM

## 2025-06-03 DIAGNOSIS — E11.65 TYPE 2 DIABETES MELLITUS WITH HYPERGLYCEMIA, WITHOUT LONG-TERM CURRENT USE OF INSULIN (HCC): Primary | ICD-10-CM

## 2025-06-03 NOTE — PATIENT INSTRUCTIONS
Patient Education     Diabetes tipo 2   Conceptos Básicos   Redactado por los médicos y editores de [employer]   ¿Qué es la diabetes tipo 2? -- La diabetes tipo 2 es un trastorno que afecta la forma en que el cuerpo utiliza el azúcar. A veces se lo llama diabetes mellitus tipo 2.  Todas las células del cuerpo necesitan azúcar para funcionar normalmente. El azúcar entra en las células con la ayuda de trisha hormona llamada insulina. La insulina se produce en el páncreas, un órgano ubicado en el área del estómago. Si no hay suficiente insulina o si el cuerpo damaris de responder a la insulina, el azúcar se acumula en la dulce. [place] es lo que les sucede a las personas con diabetes.  Existen dos tipos de diabetes:   Diabetes tipo 1 - En la diabetes tipo 1, el páncreas no produce insulina o produce muy poca.   Diabetes tipo 2 - En la mayoría de los casos de diabetes tipo 2, el cuerpo damaris de responder a la insulina normalmente. Con el tiempo, el páncreas damaris de producir suficiente insulina.  Tener exceso de peso corporal u obesidad aumenta el riesgo de desarrollar diabetes tipo 2. Sin embargo, las personas que no tienen exceso de peso corporal también pueden desarrollar diabetes.  ¿Cuáles son los síntomas de la diabetes tipo 2? -- En general, la diabetes tipo 2 no provoca síntomas. Cuando aparecen síntomas, son los siguientes, entre otros:   Necesidad de orinar con frecuencia   Sed intensa   Visión borrosa  ¿La diabetes puede ocasionar otros problemas de abdoul? -- Sí. Es posible que la diabetes tipo 2 no le cause malestar, skyla si no la controla, con el tiempo puede ocasionar problemas graves, por ejemplo:   Infartos   Accidentes cerebrovasculares (derrames)   Enfermedad renal   Problemas de visión (o incluso ceguera)   Dolor o pérdida de sensibilidad en las angella y los pies   Necesidad de cortar (amputar) los dedos de las angella, los dedos de los pies u otras partes del cuerpo  ¿Cómo puedo saber si tengo diabetes tipo  "2? -- Para averiguar si tiene diabetes tipo 2, el médico o enfermero puede realizar trisha prueba de dulce. Existen dos pruebas que pueden usarse con brady fin. Ambas consisten en medir la cantidad de azúcar en la dulce, llamada \"azúcar en dulce\" o \"glucosa en dulce\":   Trisha de las pruebas mide el azúcar en dulce en el momento en que se extrae la muestra. Esta prueba se realiza por la mañana. No podrá comer ni beber nada sonal agua flores un mínimo de 8 horas antes de la prueba.   La otra prueba indica el promedio de azúcar en dulce de los últimos 2 a 3 meses. Esta prueba de dulce se llama \"hemoglobina A1C\" o simplemente \"A1C\". Se puede revisar en cualquier momento del día, incluso si ha comido recientemente.  ¿Cómo se trata la diabetes tipo 2? -- Las metas del tratamiento son manejar el azúcar en dulce y reducir el riesgo de problemas futuros que pueden desarrollar las personas que tienen diabetes.  El tratamiento podría incluir:   Cambios en el estilo de genesis - Es un aspecto importante del manejo de la diabetes. Incluye comer alimentos saludables y hacer suficiente actividad física.   Medicinas - Existen algunas medicinas que ayudan a disminuir el azúcar en dulce. Algunas personas deben rene píldoras que permiten que el organismo genere más insulina o que posibilitan que la insulina cumpla con hardy función; otras deben aplicarse inyecciones de insulina.  Según las medicinas que tome, es posible que tenga que revisarse el azúcar en dulce periódicamente en hardy hogar, skyla no todas las personas que tienen diabetes tipo 2 necesitan hacerlo. El médico o enfermero le dirá si debe revisarse el azúcar en dulce, y cuándo y cómo hacerlo.  A veces, las personas con diabetes tipo 2 también deben rene medicinas para ayudar a prevenir los problemas que causa la enfermedad. Por ejemplo, las medicinas que se usan para bajar la presión arterial pueden disminuir las posibilidades de sufrir un infarto o un accidente " cerebrovascular (derrame).   Atención médica general - También es importante cuidar otros aspectos de hardy abdoul. Hayes incluye vigilar hardy presión arterial y jessee niveles de colesterol. También debe recibir ciertas vacunas, suleiman las vacunas para protegerse de la gripe y de la enfermedad por coronavirus 2019 (“COVID-19”). Algunas personas también necesitan trisha vacuna para prevenir la neumonía.  ¿La diabetes tipo 2 se puede prevenir? -- Sí. Para reducir jessee posibilidades de desarrollar diabetes tipo 2, lo más importante que puede hacer es tener trisha alimentación saludable y realizar mucha actividad física. [place] puede ayudarlo a bajar de peso, si tiene sobrepeso. Sin embargo, comer ewa y hacer actividad también es ritchie para hardy abdoul general. Incluso la actividad leve, suleiman caminar, tiene beneficios.  Si fuma, dejar de fumar también puede reducir hardy riesgo de desarrollar diabetes tipo 2. Dejar de fumar puede ser difícil, skyla hardy médico o enfermero puede ayudar.  Todos los artículos se actualizan a medida que se descubre nueva evidencia y culmina nuestro proceso de evaluación por homólogos   Magaly artículo se recuperó de Up[employer]Date el: Apr 24, 2024.  Artículo 04272 Versión [id number].es-419.1  Release: 32.3.2 - C32.113  © 2024 [employer] Inc. Todos los derechos reservados.  Exención de responsabilidad y uso de la información del consumidor   Descargo de responsabilidad: esta información generalizada es un resumen limitado de información sobre el diagnóstico, el tratamiento y/o los medicamentos. No pretende ser exhaustiva y se debe utilizar suleiman herramienta para ayudar al usuario a comprender y/o evaluar las posibles opciones de diagnóstico y tratamiento. No incluye toda la información sobre afecciones, tratamientos, medicamentos, efectos secundarios o riesgos puedan ser aplicables a un paciente específico. No tiene el propósito de servir suleiman recomendación médica ni de sustituir la recomendación médica, el diagnóstico o el  tratamiento de un profesional de atención médica que se base en el examen y la evaluación de brady profesional de la abdoul respecto a las circunstancias específicas y únicas del paciente. Los pacientes deben hablar con un profesional de atención médica para obtener información completa sobre hardy abdoul, cuestiones médicas y opciones de tratamiento, incluidos los riesgos o los beneficios relacionados con el uso de medicamentos. Esta información no certifica que los tratamientos o medicamentos praveen seguros, eficaces o estén aprobados para tratar a un paciente específico. [employer] y jessee afiliados renuncian a cualquier garantía o responsabilidad relacionada con esta información o el uso de la misma.El uso de esta información está sujeto a las Condiciones de uso, disponibles en https://www.Evane[employer].com/en/know/clinical-effectiveness-terms. 2024© [employer] y jessee afiliados y/o licenciantes. Todos los derechos reservados.  Copyright   © 2024 [employer] Todos los derechos reservados.

## 2025-07-02 ENCOUNTER — APPOINTMENT (OUTPATIENT)
Dept: LAB | Facility: MEDICAL CENTER | Age: 55
End: 2025-07-02
Attending: FAMILY MEDICINE
Payer: COMMERCIAL

## 2025-07-02 DIAGNOSIS — E11.65 TYPE 2 DIABETES MELLITUS WITH HYPERGLYCEMIA, WITHOUT LONG-TERM CURRENT USE OF INSULIN (HCC): Primary | ICD-10-CM

## 2025-07-02 LAB
ALBUMIN SERPL BCG-MCNC: 4.6 G/DL (ref 3.5–5)
ALP SERPL-CCNC: 58 U/L (ref 34–104)
ALT SERPL W P-5'-P-CCNC: 35 U/L (ref 7–52)
ANION GAP SERPL CALCULATED.3IONS-SCNC: 10 MMOL/L (ref 4–13)
AST SERPL W P-5'-P-CCNC: 24 U/L (ref 13–39)
BILIRUB SERPL-MCNC: 0.79 MG/DL (ref 0.2–1)
BUN SERPL-MCNC: 16 MG/DL (ref 5–25)
CALCIUM SERPL-MCNC: 9.4 MG/DL (ref 8.4–10.2)
CHLORIDE SERPL-SCNC: 100 MMOL/L (ref 96–108)
CHOLEST SERPL-MCNC: 121 MG/DL (ref ?–200)
CO2 SERPL-SCNC: 27 MMOL/L (ref 21–32)
CREAT SERPL-MCNC: 1.05 MG/DL (ref 0.6–1.3)
EST. AVERAGE GLUCOSE BLD GHB EST-MCNC: 209 MG/DL
GFR SERPL CREATININE-BSD FRML MDRD: 78 ML/MIN/1.73SQ M
GLUCOSE P FAST SERPL-MCNC: 120 MG/DL (ref 65–99)
HBA1C MFR BLD: 8.9 %
HDLC SERPL-MCNC: 34 MG/DL
LDLC SERPL CALC-MCNC: 28 MG/DL (ref 0–100)
NONHDLC SERPL-MCNC: 87 MG/DL
POTASSIUM SERPL-SCNC: 4.1 MMOL/L (ref 3.5–5.3)
PROT SERPL-MCNC: 7.5 G/DL (ref 6.4–8.4)
SODIUM SERPL-SCNC: 137 MMOL/L (ref 135–147)
TRIGL SERPL-MCNC: 295 MG/DL (ref ?–150)

## 2025-07-02 PROCEDURE — 36415 COLL VENOUS BLD VENIPUNCTURE: CPT

## 2025-07-02 PROCEDURE — 83036 HEMOGLOBIN GLYCOSYLATED A1C: CPT

## 2025-07-02 PROCEDURE — 80053 COMPREHEN METABOLIC PANEL: CPT

## 2025-07-02 PROCEDURE — 80061 LIPID PANEL: CPT

## 2025-07-07 ENCOUNTER — OFFICE VISIT (OUTPATIENT)
Dept: FAMILY MEDICINE CLINIC | Facility: CLINIC | Age: 55
End: 2025-07-07
Payer: COMMERCIAL

## 2025-07-07 VITALS
OXYGEN SATURATION: 97 % | TEMPERATURE: 97.2 F | SYSTOLIC BLOOD PRESSURE: 112 MMHG | HEIGHT: 69 IN | DIASTOLIC BLOOD PRESSURE: 70 MMHG | HEART RATE: 65 BPM | WEIGHT: 225.8 LBS | BODY MASS INDEX: 33.44 KG/M2 | RESPIRATION RATE: 18 BRPM

## 2025-07-07 DIAGNOSIS — E11.65 TYPE 2 DIABETES MELLITUS WITH HYPERGLYCEMIA, WITHOUT LONG-TERM CURRENT USE OF INSULIN (HCC): Primary | ICD-10-CM

## 2025-07-07 DIAGNOSIS — R22.31 NODULE OF FINGER OF RIGHT HAND: ICD-10-CM

## 2025-07-07 PROCEDURE — 99214 OFFICE O/P EST MOD 30 MIN: CPT | Performed by: FAMILY MEDICINE

## 2025-07-07 NOTE — PROGRESS NOTES
":  Assessment & Plan  Type 2 diabetes mellitus with hyperglycemia, without long-term current use of insulin (Formerly [facility])    Lab Results   Component Value Date    HGBA1C 8.9 (H) 07/02/2025       Orders:    Empagliflozin 25 MG TABS; Take 1 tablet (25 mg total) by mouth every morning    Nodule of finger of right hand    Orders:    Ambulatory Referral to Orthopedic Surgery; Future        History of Present Illness     Be Berry is a 54 y.o. male   HPI  Review of Systems   All other systems reviewed and are negative.    Objective   /70 (BP Location: Left arm, Patient Position: Sitting, Cuff Size: Large)   Pulse 65   Temp (!) 97.2 °F (36.2 °C) (Temporal)   Resp 18   Ht 5' 9\" (1.753 m)   Wt 102 kg (225 lb 12.8 oz)   SpO2 97%   BMI 33.34 kg/m²      Physical Exam  Vitals and nursing note reviewed.   Constitutional:       Appearance: Normal appearance. He is normal weight.     Cardiovascular:      Rate and Rhythm: Normal rate and regular rhythm.      Pulses: Normal pulses.      Heart sounds: Normal heart sounds.   Pulmonary:      Effort: Pulmonary effort is normal.      Breath sounds: Normal breath sounds.   Abdominal:      General: Abdomen is flat.      Palpations: Abdomen is soft.     Musculoskeletal:         General: Normal range of motion.      Cervical back: Normal range of motion and neck supple.     Skin:     General: Skin is warm and dry.      Capillary Refill: Capillary refill takes less than 2 seconds.     Neurological:      General: No focal deficit present.      Mental Status: He is alert and oriented to person, place, and time. Mental status is at baseline.     Psychiatric:         Mood and Affect: Mood normal.         Behavior: Behavior normal.         Thought Content: Thought content normal.           "

## 2025-07-07 NOTE — ASSESSMENT & PLAN NOTE
Lab Results   Component Value Date    HGBA1C 8.9 (H) 07/02/2025       Orders:    Empagliflozin 25 MG TABS; Take 1 tablet (25 mg total) by mouth every morning

## 2025-07-23 ENCOUNTER — APPOINTMENT (OUTPATIENT)
Dept: RADIOLOGY | Facility: MEDICAL CENTER | Age: 55
End: 2025-07-23
Attending: STUDENT IN AN ORGANIZED HEALTH CARE EDUCATION/TRAINING PROGRAM
Payer: COMMERCIAL

## 2025-07-23 VITALS
OXYGEN SATURATION: 97 % | HEIGHT: 69 IN | BODY MASS INDEX: 33.33 KG/M2 | WEIGHT: 225 LBS | TEMPERATURE: 97.9 F | RESPIRATION RATE: 16 BRPM | HEART RATE: 66 BPM

## 2025-07-23 DIAGNOSIS — R22.31 NODULE OF FINGER OF RIGHT HAND: ICD-10-CM

## 2025-07-23 DIAGNOSIS — M67.449 MUCOUS CYST OF FINGER: Primary | ICD-10-CM

## 2025-07-23 PROCEDURE — 73140 X-RAY EXAM OF FINGER(S): CPT

## 2025-07-23 PROCEDURE — 99213 OFFICE O/P EST LOW 20 MIN: CPT | Performed by: STUDENT IN AN ORGANIZED HEALTH CARE EDUCATION/TRAINING PROGRAM

## 2025-07-23 NOTE — ASSESSMENT & PLAN NOTE
54 y.o. male presents with signs and symptoms consistent with the above diagnosis.  We discussed the natural history of this condition and its pathogenesis.  We discussed operative and nonoperative treatment options.    The anatomy and physiology of the ganglion was discussed with the patient today in the office.  Fluid leaking out of the joint surface typically creates a small sac, which can enlarge and cause pain or limitation of motion.  Treatment options include observation, aspiration, or surgical incision were discussed with the patient today.  Observation typically lead to resolution and approximately 10% of patients, aspiration results in resolution of approximately 50% of patients, and surgical excision leads to resolution in approximately 97% of patients.  After discussion with the patient today, the patient voiced understanding of all treatment options.    The patient elected to continue to monitor the cyst. He will follow up on an as needed basis.     he expressed understanding of the plan and agreed. We encouraged them to contact our office with any questions or concerns.       Orders:    Ambulatory Referral to Orthopedic Surgery    XR thumb right first digit-min 2v; Future

## 2025-07-23 NOTE — PROGRESS NOTES
ASSESSMENT/PLAN:    Assessment & Plan  Mucous cyst of finger    54 y.o. male presents with signs and symptoms consistent with the above diagnosis.  We discussed the natural history of this condition and its pathogenesis.  We discussed operative and nonoperative treatment options.    The anatomy and physiology of the ganglion was discussed with the patient today in the office.  Fluid leaking out of the joint surface typically creates a small sac, which can enlarge and cause pain or limitation of motion.  Treatment options include observation, aspiration, or surgical incision were discussed with the patient today.  Observation typically lead to resolution and approximately 10% of patients, aspiration results in resolution of approximately 50% of patients, and surgical excision leads to resolution in approximately 97% of patients.  After discussion with the patient today, the patient voiced understanding of all treatment options.    The patient elected to continue to monitor the cyst. He will follow up on an as needed basis.     he expressed understanding of the plan and agreed. We encouraged them to contact our office with any questions or concerns.       Orders:    Ambulatory Referral to Orthopedic Surgery    XR thumb right first digit-min 2v; Future         1. Mucous cyst of finger  Ambulatory Referral to Orthopedic Surgery    XR thumb right first digit-min 2v          _____________________________________________________  CHIEF COMPLAINT:  Right Thumb    Referred By:  Varghese Frost Md  [address] [place] [address]    SUBJECTIVE:  Be Berry is a 54 y.o. right hand dominant male presenting for evaluation of right thumb cyst. Notes he has a cyst on his right thumb for a few years. It has not grown in the last few years. Here to establish care. Denies numbness or tingling. He denies pain. He is able to more the thumb without restrictions,    ADLs: Community ambulator  Smoke: No   ETOH: yes    "Drugs:  No Job: [employer]       Occupation/hobbies: /delievery      PAST MEDICAL HISTORY:  Past Medical History[1]    PAST SURGICAL HISTORY:  Past Surgical History[2]    FAMILY HISTORY:  Family History[3]    SOCIAL HISTORY:  Social History[4]    MEDICATIONS:  Current Medications[5]    ALLERGIES:  Allergies[6]    REVIEW OF SYSTEMS:  Pertinent items are noted in HPI.  A comprehensive review of systems was negative.    LABS:  HgA1c:   Lab Results   Component Value Date    HGBA1C 8.9 (H) 07/02/2025     BMP:   Lab Results   Component Value Date    GLUCOSE 108 06/30/2014    CALCIUM 9.4 07/02/2025     06/30/2014    K 4.1 07/02/2025    CO2 27 07/02/2025     07/02/2025    BUN 16 07/02/2025    CREATININE 1.05 07/02/2025         _____________________________________________________  PHYSICAL EXAMINATION:  Pulse 66   Temp 97.9 °F (36.6 °C) (Temporal)   Resp 16   Ht 5' 9\" (1.753 m)   Wt 102 kg (225 lb)   SpO2 97%   BMI 33.23 kg/m²     General: Well developed and well nourished, alert & oriented x 3, appears comfortable   Psychiatric: Normal   HEENT: Normocephalic, Atraumatic Trachea Midline, No torticollis   Pulmonary: No audible wheezing or respiratory distress   Abdomen/GI: Non tender, non distended   Cardiovascular: No pitting edema, 2+ radial pulse     Cervcial Spine: Negative Spurling's    MUSCULOSKELETAL EXAMINATION:  Right thumb   Skin intact  5 mm round semifirm fluctuant mass over the dorsal thumb IP joint consistent with ganglion cyst  Transillumination positive  No erythema or signs of infection  Range of Motion:  Elbow: extension/flexion 0/140  Forearm: pronation/supination 80/80  Wrist: extension/flexion 70/70  Digit: full AROM in DIP, PIP, MP joints  Neurovascular:  Motor Exam: firing AIN/PIN/U. 5/5 motor   Sensory Exam: Sensation intact to light touch in FDWS (radial), volar IF (median), volar SF (ulnar)  Vascular Exam: < 2 sec capillary refill "     _____________________________________________________  STUDIES REVIEWED:  Radiographs: I personally reviewed and independently interpreted the available radiographs.  7/23/2025: Radiographs of the right thumb, multiple views, demonstrate mild degenerative changes of the IP joint of the thumb with no evidence of fracture .         Marquez García MD  Hand and Upper Extremity Surgery        *This note was dictated using Dragon voice recognition software. Please excuse any word substitutions or errors.*         Scribe Attestation      I,:  Amanda Peraza am acting as a scribe while in the presence of the attending physician.:       I,:  Marquez García MD personally performed the services described in this documentation    as scribed in my presence.:                  [1]   Past Medical History:  Diagnosis Date    Asthma 01/17/2013    Last assessed    CPAP (continuous positive airway pressure) dependence     De Quervain's tenosynovitis, right 03/23/2017    Diabetes mellitus (HCC)     type 2    Hyperlipidemia     Hypertension     Sleep apnea    [2]   Past Surgical History:  Procedure Laterality Date    CLAVICLE SURGERY      INGUINAL HERNIA REPAIR Left     ORCHIECTOMY Left     DC INCISION EXTENSOR TENDON SHEATH WRIST Right 3/23/2017    Procedure: RELEASE DEQUERVAINS;  Surgeon: Freedom Bajwa MD;  Location: [facility];  Service: Orthopedics    DC NEUROPLASTY &/TRANSPOS MEDIAN NRV CARPAL TUNNE Left 9/16/2024    Procedure: RELEASE CARPAL TUNNEL;  Surgeon: Hussain Menezes DO;  Location: [facility];  Service: Orthopedics    ROTATOR CUFF REPAIR Right    [3]   Family History  Problem Relation Name Age of Onset    Asthma Mother      Diabetes Mother      Atrial fibrillation Father     [4]   Social History  Tobacco Use    Smoking status: Never     Passive exposure: Never    Smokeless tobacco: Never   Vaping Use    Vaping status: Never Used   Substance Use Topics    Alcohol use: Yes     Comment: social    Drug use: No   [5]    Current Outpatient Medications:     albuterol (Ventolin HFA) 90 mcg/act inhaler, Inhale 2 puffs every 6 (six) hours as needed for wheezing, Disp: 18 g, Rfl: 3    Blood Glucose Monitoring Suppl (OneTouch Verio Reflect) w/Device KIT, Check blood sugars once daily. Please substitute with appropriate alternative as covered by patient's insurance. Dx: E11.65, Disp: 1 kit, Rfl: 0    Empagliflozin 25 MG TABS, Take 1 tablet (25 mg total) by mouth every morning, Disp: 90 tablet, Rfl: 3    glipiZIDE-metFORMIN (METAGLIP) 2.5-500 MG per tablet, TAKE 2 TABLETS TWICE A DAY, Disp: 360 tablet, Rfl: 3    Glucosamine-Chondroitin (GLUCOSAMINE CHONDR COMPLEX PO), Take by mouth, Disp: , Rfl:     glucose blood (FREESTYLE LITE) test strip, Test daily., Disp: 100 each, Rfl: 5    glucose blood (OneTouch Verio) test strip, Check blood sugars once daily. Please substitute with appropriate alternative as covered by patient's insurance. Dx: E11.65, Disp: 100 each, Rfl: 0    ipratropium (ATROVENT) 0.03 % nasal spray, 2 sprays into each nostril every 12 (twelve) hours, Disp: 30 mL, Rfl: 5    meclizine (ANTIVERT) 25 mg tablet, Take 1 tablet (25 mg total) by mouth 3 (three) times a day as needed for dizziness, Disp: 30 tablet, Rfl: 0    metoprolol succinate (TOPROL-XL) 50 mg 24 hr tablet, TAKE 1 TABLET DAILY, Disp: 90 tablet, Rfl: 3    olmesartan-hydrochlorothiazide (BENICAR HCT) 40-25 MG per tablet, TAKE 1 TABLET DAILY, Disp: 90 tablet, Rfl: 3    OneTouch Delica Lancets 33G MISC, Check blood sugars once daily. Please substitute with appropriate alternative as covered by patient's insurance. Dx: E11.65, Disp: 100 each, Rfl: 3    Ozempic, 2 MG/DOSE, 8 MG/3ML injection pen, INJECT 2 MG UNDER THE SKIN EVERY 7 DAYS, Disp: 9 mL, Rfl: 3    simvastatin (ZOCOR) 20 mg tablet, TAKE 1 TABLET DAILY IN THE EVENING, Disp: 90 tablet, Rfl: 3  [6] No Known Allergies

## 2025-07-29 DIAGNOSIS — R69 SICK: ICD-10-CM

## 2025-07-29 DIAGNOSIS — R69 SICK: Primary | ICD-10-CM

## 2025-07-29 RX ORDER — AZITHROMYCIN 250 MG/1
TABLET, FILM COATED ORAL
Qty: 5 TABLET | Refills: 0 | Status: SHIPPED | OUTPATIENT
Start: 2025-07-29 | End: 2025-07-29

## 2025-07-29 RX ORDER — AZITHROMYCIN 250 MG/1
TABLET, FILM COATED ORAL
Qty: 6 TABLET | Refills: 0 | Status: SHIPPED | OUTPATIENT
Start: 2025-07-29 | End: 2025-08-02

## 2025-08-12 ENCOUNTER — OFFICE VISIT (OUTPATIENT)
Age: 55
End: 2025-08-12

## (undated) DEVICE — SPLINT COMFORT 3 X 12

## (undated) DEVICE — GLOVE INDICATOR PI UNDERGLOVE SZ 8 BLUE

## (undated) DEVICE — ASTOUND FABRIC REINFORCED SURGICAL GOWN: Brand: CONVERTORS

## (undated) DEVICE — STRETCH BANDAGE: Brand: CURITY

## (undated) DEVICE — OCCLUSIVE GAUZE STRIP,3% BISMUTH TRIBROMOPHENATE IN PETROLATUM BLEND: Brand: XEROFORM

## (undated) DEVICE — 3000CC GUARDIAN II: Brand: GUARDIAN

## (undated) DEVICE — CURITY STRETCH BANDAGE: Brand: CURITY

## (undated) DEVICE — SPLINT 4 X 15 IN PRECUT SYNTHETIC

## (undated) DEVICE — STERILE BETHLEHEM PLASTIC HAND: Brand: CARDINAL HEALTH

## (undated) DEVICE — DECANTER: Brand: UNBRANDED

## (undated) DEVICE — GLOVE INDICATOR PI UNDERGLOVE SZ 7.5 BLUE

## (undated) DEVICE — SUT VICRYL 3-0 SH 27 IN J416H

## (undated) DEVICE — 4-PORT MANIFOLD: Brand: NEPTUNE 2

## (undated) DEVICE — SYRINGE 20ML LL

## (undated) DEVICE — ABDOMINAL PAD: Brand: DERMACEA

## (undated) DEVICE — STOCKINETTE,IMPERVIOUS,12X48,STERILE: Brand: MEDLINE

## (undated) DEVICE — PADDING CAST 4 IN  COTTON STRL

## (undated) DEVICE — GLOVE SRG BIOGEL 7.5

## (undated) DEVICE — LIGHT GLOVE GREEN

## (undated) DEVICE — GAUZE SPONGES,16 PLY: Brand: CURITY

## (undated) DEVICE — U-DRAPE: Brand: CONVERTORS

## (undated) DEVICE — 3M™ IOBAN™ 2 ANTIMICROBIAL INCISE DRAPE 6648EZ: Brand: IOBAN™ 2

## (undated) DEVICE — SYRINGE BULB 2 OZ

## (undated) DEVICE — ACE WRAP 3 IN STERILE

## (undated) DEVICE — NEEDLE 25G X 1 1/2

## (undated) DEVICE — READY WET SKIN SCRUB TRAY-LF: Brand: MEDLINE INDUSTRIES, INC.

## (undated) DEVICE — NEEDLE 25G X 5/8 SAFETY

## (undated) DEVICE — SYRINGE 10ML LL

## (undated) DEVICE — URETERAL CATHETER ADAPTOR TIP

## (undated) DEVICE — INTENDED FOR TISSUE SEPARATION, AND OTHER PROCEDURES THAT REQUIRE A SHARP SURGICAL BLADE TO PUNCTURE OR CUT.: Brand: BARD-PARKER SAFETY BLADES SIZE 15, STERILE

## (undated) DEVICE — GLOVE SRG BIOGEL 8

## (undated) DEVICE — ZIMMER® STERILE DISPOSABLE TOURNIQUET CUFF, DUAL PORT, SINGLE BLADDER, 18 IN. (46 CM)

## (undated) DEVICE — INTENDED FOR TISSUE SEPARATION, AND OTHER PROCEDURES THAT REQUIRE A SHARP SURGICAL BLADE TO PUNCTURE OR CUT.: Brand: BARD-PARKER ® CARBON RIB-BACK BLADES

## (undated) DEVICE — SUT MONOCRYL 4-0 PS-2 27 IN Y426H

## (undated) DEVICE — PENCIL ROCKER SWITCH CAUTERY HAND CONTROL

## (undated) DEVICE — NEEDLE 18 G X 1 1/2 SAFETY

## (undated) DEVICE — PADDING CAST 3IN COTTON STRL

## (undated) DEVICE — SUT ETHILON 4-0 PS-2 18 IN 1667H

## (undated) DEVICE — CHLORAPREP HI-LITE 26ML ORANGE

## (undated) DEVICE — UNIVERSAL  MINOR EXTREMITY PK: Brand: CARDINAL HEALTH

## (undated) DEVICE — BIPOLAR FORCEPS CORD,BANANA LEADS: Brand: VALLEYLAB

## (undated) DEVICE — SUT ETHILON 3-0 INFS-1 30 IN 669H

## (undated) DEVICE — ADHESIVE SKN CLSR HISTOACRYL FLEX 0.5ML LF

## (undated) DEVICE — PREP SURGICAL PURPREP 26ML

## (undated) DEVICE — IMPERVIOUS STOCKINETTE: Brand: DEROYAL

## (undated) DEVICE — DISPOSABLE OR TOWEL: Brand: CARDINAL HEALTH